# Patient Record
Sex: MALE | URBAN - METROPOLITAN AREA
[De-identification: names, ages, dates, MRNs, and addresses within clinical notes are randomized per-mention and may not be internally consistent; named-entity substitution may affect disease eponyms.]

---

## 2022-10-19 ENCOUNTER — APPOINTMENT (OUTPATIENT)
Dept: RADIOLOGY | Facility: MEDICAL CENTER | Age: 72
DRG: 190 | End: 2022-10-19
Attending: EMERGENCY MEDICINE
Payer: MEDICARE

## 2022-10-19 ENCOUNTER — APPOINTMENT (OUTPATIENT)
Dept: RADIOLOGY | Facility: MEDICAL CENTER | Age: 72
DRG: 190 | End: 2022-10-19
Attending: STUDENT IN AN ORGANIZED HEALTH CARE EDUCATION/TRAINING PROGRAM
Payer: MEDICARE

## 2022-10-19 ENCOUNTER — HOSPITAL ENCOUNTER (INPATIENT)
Facility: MEDICAL CENTER | Age: 72
LOS: 1 days | DRG: 190 | End: 2022-10-20
Attending: EMERGENCY MEDICINE | Admitting: INTERNAL MEDICINE
Payer: MEDICARE

## 2022-10-19 ENCOUNTER — APPOINTMENT (OUTPATIENT)
Dept: CARDIOLOGY | Facility: MEDICAL CENTER | Age: 72
DRG: 190 | End: 2022-10-19
Attending: INTERNAL MEDICINE
Payer: MEDICARE

## 2022-10-19 DIAGNOSIS — R47.9 SPEECH DISTURBANCE, UNSPECIFIED TYPE: ICD-10-CM

## 2022-10-19 DIAGNOSIS — R53.1 WEAKNESS: ICD-10-CM

## 2022-10-19 PROBLEM — R09.02 HYPOXIA: Status: ACTIVE | Noted: 2022-10-19

## 2022-10-19 PROBLEM — I25.2 HISTORY OF MI (MYOCARDIAL INFARCTION): Status: ACTIVE | Noted: 2022-10-19

## 2022-10-19 PROBLEM — R29.90 STROKE-LIKE SYMPTOMS: Status: ACTIVE | Noted: 2022-10-19

## 2022-10-19 PROBLEM — R25.1 TREMOR: Status: ACTIVE | Noted: 2022-10-19

## 2022-10-19 PROBLEM — J44.9 COPD (CHRONIC OBSTRUCTIVE PULMONARY DISEASE) (HCC): Status: ACTIVE | Noted: 2022-10-19

## 2022-10-19 PROBLEM — F39 MOOD DISORDER (HCC): Status: ACTIVE | Noted: 2022-10-19

## 2022-10-19 PROBLEM — J96.00 ACUTE RESPIRATORY FAILURE (HCC): Status: ACTIVE | Noted: 2022-10-19

## 2022-10-19 PROBLEM — Z95.818 PRESENCE OF IMPLANTABLE PULMONARY ARTERY PRESSURE AND HEART RATE MONITORING SYSTEM: Status: ACTIVE | Noted: 2022-10-19

## 2022-10-19 LAB
ABO + RH BLD: NORMAL
ABO GROUP BLD: NORMAL
ALBUMIN SERPL BCP-MCNC: 3.4 G/DL (ref 3.2–4.9)
ALBUMIN/GLOB SERPL: 1 G/DL
ALP SERPL-CCNC: 63 U/L (ref 30–99)
ALT SERPL-CCNC: 59 U/L (ref 2–50)
ANION GAP SERPL CALC-SCNC: 10 MMOL/L (ref 7–16)
ANISOCYTOSIS BLD QL SMEAR: ABNORMAL
APTT PPP: 27.4 SEC (ref 24.7–36)
AST SERPL-CCNC: 39 U/L (ref 12–45)
BASOPHILS # BLD AUTO: 0 % (ref 0–1.8)
BASOPHILS # BLD: 0 K/UL (ref 0–0.12)
BILIRUB SERPL-MCNC: 0.3 MG/DL (ref 0.1–1.5)
BLD GP AB SCN SERPL QL: NORMAL
BUN SERPL-MCNC: 14 MG/DL (ref 8–22)
BURR CELLS BLD QL SMEAR: NORMAL
CALCIUM SERPL-MCNC: 8.7 MG/DL (ref 8.5–10.5)
CHLORIDE SERPL-SCNC: 100 MMOL/L (ref 96–112)
CHOLEST SERPL-MCNC: 107 MG/DL (ref 100–199)
CO2 SERPL-SCNC: 25 MMOL/L (ref 20–33)
COMMENT 1642: NORMAL
CREAT SERPL-MCNC: 0.89 MG/DL (ref 0.5–1.4)
EKG IMPRESSION: NORMAL
EOSINOPHIL # BLD AUTO: 0.04 K/UL (ref 0–0.51)
EOSINOPHIL NFR BLD: 0.9 % (ref 0–6.9)
ERYTHROCYTE [DISTWIDTH] IN BLOOD BY AUTOMATED COUNT: 66.3 FL (ref 35.9–50)
EST. AVERAGE GLUCOSE BLD GHB EST-MCNC: 166 MG/DL
FLUAV RNA SPEC QL NAA+PROBE: POSITIVE
FLUBV RNA SPEC QL NAA+PROBE: NEGATIVE
GFR SERPLBLD CREATININE-BSD FMLA CKD-EPI: 90 ML/MIN/1.73 M 2
GLOBULIN SER CALC-MCNC: 3.3 G/DL (ref 1.9–3.5)
GLUCOSE BLD STRIP.AUTO-MCNC: 125 MG/DL (ref 65–99)
GLUCOSE SERPL-MCNC: 141 MG/DL (ref 65–99)
HBA1C MFR BLD: 7.4 % (ref 4–5.6)
HCT VFR BLD AUTO: 32.7 % (ref 42–52)
HDLC SERPL-MCNC: 48 MG/DL
HGB BLD-MCNC: 10.6 G/DL (ref 14–18)
IMM GRANULOCYTES # BLD AUTO: 0.02 K/UL (ref 0–0.11)
IMM GRANULOCYTES NFR BLD AUTO: 0.4 % (ref 0–0.9)
INR PPP: 1.19 (ref 0.87–1.13)
LDLC SERPL CALC-MCNC: 46 MG/DL
LV EJECT FRACT  99904: 70
LV EJECT FRACT MOD 2C 99903: 55.32
LV EJECT FRACT MOD 4C 99902: 49.34
LV EJECT FRACT MOD BP 99901: 52.53
LYMPHOCYTES # BLD AUTO: 1.74 K/UL (ref 1–4.8)
LYMPHOCYTES NFR BLD: 37.5 % (ref 22–41)
MACROCYTES BLD QL SMEAR: ABNORMAL
MAGNESIUM SERPL-MCNC: 1.8 MG/DL (ref 1.5–2.5)
MCH RBC QN AUTO: 28.3 PG (ref 27–33)
MCHC RBC AUTO-ENTMCNC: 32.4 G/DL (ref 33.7–35.3)
MCV RBC AUTO: 87.4 FL (ref 81.4–97.8)
MONOCYTES # BLD AUTO: 0.3 K/UL (ref 0–0.85)
MONOCYTES NFR BLD AUTO: 6.5 % (ref 0–13.4)
MORPHOLOGY BLD-IMP: NORMAL
NEUTROPHILS # BLD AUTO: 2.54 K/UL (ref 1.82–7.42)
NEUTROPHILS NFR BLD: 54.7 % (ref 44–72)
NRBC # BLD AUTO: 0 K/UL
NRBC BLD-RTO: 0 /100 WBC
NT-PROBNP SERPL IA-MCNC: 79 PG/ML (ref 0–125)
OVALOCYTES BLD QL SMEAR: NORMAL
PHOSPHATE SERPL-MCNC: 2.8 MG/DL (ref 2.5–4.5)
PLATELET # BLD AUTO: 251 K/UL (ref 164–446)
PLATELET BLD QL SMEAR: NORMAL
PMV BLD AUTO: 11.3 FL (ref 9–12.9)
POIKILOCYTOSIS BLD QL SMEAR: NORMAL
POTASSIUM SERPL-SCNC: 3.7 MMOL/L (ref 3.6–5.5)
PROCALCITONIN SERPL-MCNC: 0.17 NG/ML
PROT SERPL-MCNC: 6.7 G/DL (ref 6–8.2)
PROTHROMBIN TIME: 14.9 SEC (ref 12–14.6)
RBC # BLD AUTO: 3.74 M/UL (ref 4.7–6.1)
RBC BLD AUTO: PRESENT
RH BLD: NORMAL
RSV RNA SPEC QL NAA+PROBE: NEGATIVE
SARS-COV-2 RNA RESP QL NAA+PROBE: NOTDETECTED
SODIUM SERPL-SCNC: 135 MMOL/L (ref 135–145)
SPECIMEN SOURCE: ABNORMAL
TRIGL SERPL-MCNC: 63 MG/DL (ref 0–149)
TROPONIN T SERPL-MCNC: 14 NG/L (ref 6–19)
TSH SERPL DL<=0.005 MIU/L-ACNC: 1.1 UIU/ML (ref 0.38–5.33)
WBC # BLD AUTO: 4.6 K/UL (ref 4.8–10.8)

## 2022-10-19 PROCEDURE — 700111 HCHG RX REV CODE 636 W/ 250 OVERRIDE (IP): Performed by: INTERNAL MEDICINE

## 2022-10-19 PROCEDURE — 99223 1ST HOSP IP/OBS HIGH 75: CPT | Mod: FS | Performed by: NURSE PRACTITIONER

## 2022-10-19 PROCEDURE — 80053 COMPREHEN METABOLIC PANEL: CPT

## 2022-10-19 PROCEDURE — 86901 BLOOD TYPING SEROLOGIC RH(D): CPT

## 2022-10-19 PROCEDURE — 84484 ASSAY OF TROPONIN QUANT: CPT

## 2022-10-19 PROCEDURE — 86900 BLOOD TYPING SEROLOGIC ABO: CPT

## 2022-10-19 PROCEDURE — A9270 NON-COVERED ITEM OR SERVICE: HCPCS | Performed by: INTERNAL MEDICINE

## 2022-10-19 PROCEDURE — C9803 HOPD COVID-19 SPEC COLLECT: HCPCS | Performed by: INTERNAL MEDICINE

## 2022-10-19 PROCEDURE — 85025 COMPLETE CBC W/AUTO DIFF WBC: CPT

## 2022-10-19 PROCEDURE — 700102 HCHG RX REV CODE 250 W/ 637 OVERRIDE(OP): Performed by: INTERNAL MEDICINE

## 2022-10-19 PROCEDURE — 82962 GLUCOSE BLOOD TEST: CPT

## 2022-10-19 PROCEDURE — 700101 HCHG RX REV CODE 250: Performed by: INTERNAL MEDICINE

## 2022-10-19 PROCEDURE — 86850 RBC ANTIBODY SCREEN: CPT

## 2022-10-19 PROCEDURE — 0241U HCHG SARS-COV-2 COVID-19 NFCT DS RESP RNA 4 TRGT MIC: CPT

## 2022-10-19 PROCEDURE — 0042T CT-CEREBRAL PERFUSION ANALYSIS: CPT

## 2022-10-19 PROCEDURE — 93306 TTE W/DOPPLER COMPLETE: CPT | Mod: 26 | Performed by: INTERNAL MEDICINE

## 2022-10-19 PROCEDURE — 700117 HCHG RX CONTRAST REV CODE 255: Performed by: EMERGENCY MEDICINE

## 2022-10-19 PROCEDURE — 92610 EVALUATE SWALLOWING FUNCTION: CPT

## 2022-10-19 PROCEDURE — 80061 LIPID PANEL: CPT

## 2022-10-19 PROCEDURE — 93005 ELECTROCARDIOGRAM TRACING: CPT | Performed by: EMERGENCY MEDICINE

## 2022-10-19 PROCEDURE — 85730 THROMBOPLASTIN TIME PARTIAL: CPT

## 2022-10-19 PROCEDURE — 83735 ASSAY OF MAGNESIUM: CPT

## 2022-10-19 PROCEDURE — 70496 CT ANGIOGRAPHY HEAD: CPT

## 2022-10-19 PROCEDURE — 85610 PROTHROMBIN TIME: CPT

## 2022-10-19 PROCEDURE — 770020 HCHG ROOM/CARE - TELE (206)

## 2022-10-19 PROCEDURE — 700117 HCHG RX CONTRAST REV CODE 255: Performed by: INTERNAL MEDICINE

## 2022-10-19 PROCEDURE — 36415 COLL VENOUS BLD VENIPUNCTURE: CPT

## 2022-10-19 PROCEDURE — 99223 1ST HOSP IP/OBS HIGH 75: CPT | Mod: AI,GC | Performed by: STUDENT IN AN ORGANIZED HEALTH CARE EDUCATION/TRAINING PROGRAM

## 2022-10-19 PROCEDURE — 84145 PROCALCITONIN (PCT): CPT

## 2022-10-19 PROCEDURE — 84443 ASSAY THYROID STIM HORMONE: CPT

## 2022-10-19 PROCEDURE — 84100 ASSAY OF PHOSPHORUS: CPT

## 2022-10-19 PROCEDURE — 96374 THER/PROPH/DIAG INJ IV PUSH: CPT

## 2022-10-19 PROCEDURE — 70450 CT HEAD/BRAIN W/O DYE: CPT

## 2022-10-19 PROCEDURE — 71045 X-RAY EXAM CHEST 1 VIEW: CPT

## 2022-10-19 PROCEDURE — 83036 HEMOGLOBIN GLYCOSYLATED A1C: CPT

## 2022-10-19 PROCEDURE — 83880 ASSAY OF NATRIURETIC PEPTIDE: CPT

## 2022-10-19 PROCEDURE — 99285 EMERGENCY DEPT VISIT HI MDM: CPT

## 2022-10-19 PROCEDURE — 70498 CT ANGIOGRAPHY NECK: CPT

## 2022-10-19 PROCEDURE — 94664 DEMO&/EVAL PT USE INHALER: CPT

## 2022-10-19 PROCEDURE — 94640 AIRWAY INHALATION TREATMENT: CPT

## 2022-10-19 PROCEDURE — 93306 TTE W/DOPPLER COMPLETE: CPT

## 2022-10-19 PROCEDURE — 94760 N-INVAS EAR/PLS OXIMETRY 1: CPT

## 2022-10-19 RX ORDER — ALBUTEROL SULFATE 90 UG/1
2 AEROSOL, METERED RESPIRATORY (INHALATION) EVERY 6 HOURS PRN
COMMUNITY

## 2022-10-19 RX ORDER — TRIPROLIDINE HYDROCHLORIDE 2.5 MG/5ML
5 SYRUP ORAL EVERY 8 HOURS PRN
COMMUNITY

## 2022-10-19 RX ORDER — LORAZEPAM 2 MG/ML
1 INJECTION INTRAMUSCULAR ONCE
Status: ACTIVE | OUTPATIENT
Start: 2022-10-19 | End: 2022-10-20

## 2022-10-19 RX ORDER — POLYETHYLENE GLYCOL 3350 17 G/17G
1 POWDER, FOR SOLUTION ORAL
Status: DISCONTINUED | OUTPATIENT
Start: 2022-10-19 | End: 2022-10-20 | Stop reason: HOSPADM

## 2022-10-19 RX ORDER — ONDANSETRON 2 MG/ML
4 INJECTION INTRAMUSCULAR; INTRAVENOUS EVERY 4 HOURS PRN
Status: DISCONTINUED | OUTPATIENT
Start: 2022-10-19 | End: 2022-10-20 | Stop reason: HOSPADM

## 2022-10-19 RX ORDER — BISMUTH SUBSALICYLATE 262 MG/1
524 TABLET, CHEWABLE ORAL 4 TIMES DAILY PRN
Status: DISCONTINUED | OUTPATIENT
Start: 2022-10-19 | End: 2022-10-20 | Stop reason: HOSPADM

## 2022-10-19 RX ORDER — MIRTAZAPINE 30 MG/1
30 TABLET, FILM COATED ORAL NIGHTLY
COMMUNITY

## 2022-10-19 RX ORDER — FUROSEMIDE 10 MG/ML
40 INJECTION INTRAMUSCULAR; INTRAVENOUS
Status: DISCONTINUED | OUTPATIENT
Start: 2022-10-19 | End: 2022-10-20 | Stop reason: HOSPADM

## 2022-10-19 RX ORDER — ASPIRIN 300 MG/1
300 SUPPOSITORY RECTAL DAILY
Status: DISCONTINUED | OUTPATIENT
Start: 2022-10-19 | End: 2022-10-19

## 2022-10-19 RX ORDER — MIRTAZAPINE 30 MG/1
30 TABLET, FILM COATED ORAL NIGHTLY
Status: DISCONTINUED | OUTPATIENT
Start: 2022-10-19 | End: 2022-10-20 | Stop reason: HOSPADM

## 2022-10-19 RX ORDER — PANTOPRAZOLE SODIUM 40 MG/1
40 TABLET, DELAYED RELEASE ORAL DAILY
Status: DISCONTINUED | OUTPATIENT
Start: 2022-10-19 | End: 2022-10-19

## 2022-10-19 RX ORDER — ROSUVASTATIN CALCIUM 40 MG/1
40 TABLET, COATED ORAL DAILY
COMMUNITY

## 2022-10-19 RX ORDER — ROSUVASTATIN CALCIUM 20 MG/1
40 TABLET, COATED ORAL EVERY EVENING
Status: DISCONTINUED | OUTPATIENT
Start: 2022-10-19 | End: 2022-10-20 | Stop reason: HOSPADM

## 2022-10-19 RX ORDER — ASPIRIN 325 MG
325 TABLET ORAL DAILY
Status: DISCONTINUED | OUTPATIENT
Start: 2022-10-19 | End: 2022-10-19

## 2022-10-19 RX ORDER — ASPIRIN 81 MG/1
81 TABLET ORAL DAILY
COMMUNITY

## 2022-10-19 RX ORDER — BUDESONIDE AND FORMOTEROL FUMARATE DIHYDRATE 80; 4.5 UG/1; UG/1
2 AEROSOL RESPIRATORY (INHALATION)
Status: DISCONTINUED | OUTPATIENT
Start: 2022-10-19 | End: 2022-10-20 | Stop reason: HOSPADM

## 2022-10-19 RX ORDER — DULOXETIN HYDROCHLORIDE 60 MG/1
60 CAPSULE, DELAYED RELEASE ORAL 2 TIMES DAILY
Status: DISCONTINUED | OUTPATIENT
Start: 2022-10-19 | End: 2022-10-20 | Stop reason: HOSPADM

## 2022-10-19 RX ORDER — ATORVASTATIN CALCIUM 40 MG/1
40 TABLET, FILM COATED ORAL EVERY EVENING
Status: DISCONTINUED | OUTPATIENT
Start: 2022-10-19 | End: 2022-10-19

## 2022-10-19 RX ORDER — ALPRAZOLAM 1 MG/1
1 TABLET ORAL
COMMUNITY

## 2022-10-19 RX ORDER — ALBUTEROL SULFATE 90 UG/1
2 AEROSOL, METERED RESPIRATORY (INHALATION)
Status: DISCONTINUED | OUTPATIENT
Start: 2022-10-19 | End: 2022-10-20 | Stop reason: HOSPADM

## 2022-10-19 RX ORDER — METOPROLOL SUCCINATE 25 MG/1
25 TABLET, EXTENDED RELEASE ORAL DAILY
Status: DISCONTINUED | OUTPATIENT
Start: 2022-10-19 | End: 2022-10-20 | Stop reason: HOSPADM

## 2022-10-19 RX ORDER — BENZONATATE 100 MG/1
100-200 CAPSULE ORAL 3 TIMES DAILY PRN
COMMUNITY

## 2022-10-19 RX ORDER — BUSPIRONE HYDROCHLORIDE 10 MG/1
30 TABLET ORAL 2 TIMES DAILY
Status: DISCONTINUED | OUTPATIENT
Start: 2022-10-19 | End: 2022-10-20 | Stop reason: HOSPADM

## 2022-10-19 RX ORDER — ASPIRIN 81 MG/1
324 TABLET, CHEWABLE ORAL DAILY
Status: DISCONTINUED | OUTPATIENT
Start: 2022-10-19 | End: 2022-10-19

## 2022-10-19 RX ORDER — AMOXICILLIN 250 MG
2 CAPSULE ORAL 2 TIMES DAILY
Status: DISCONTINUED | OUTPATIENT
Start: 2022-10-19 | End: 2022-10-20 | Stop reason: HOSPADM

## 2022-10-19 RX ORDER — OMEPRAZOLE 20 MG/1
40 CAPSULE, DELAYED RELEASE ORAL DAILY
Status: DISCONTINUED | OUTPATIENT
Start: 2022-10-19 | End: 2022-10-20 | Stop reason: HOSPADM

## 2022-10-19 RX ORDER — LORAZEPAM 2 MG/ML
2 INJECTION INTRAMUSCULAR ONCE
Status: DISCONTINUED | OUTPATIENT
Start: 2022-10-19 | End: 2022-10-19

## 2022-10-19 RX ORDER — PRIMIDONE 50 MG/1
50 TABLET ORAL EVERY EVENING
COMMUNITY

## 2022-10-19 RX ORDER — BUSPIRONE HYDROCHLORIDE 30 MG/1
30 TABLET ORAL 2 TIMES DAILY
COMMUNITY

## 2022-10-19 RX ORDER — OSELTAMIVIR PHOSPHATE 45 MG/1
45 CAPSULE ORAL EVERY 12 HOURS
COMMUNITY

## 2022-10-19 RX ORDER — ACETAMINOPHEN 325 MG/1
650 TABLET ORAL EVERY 6 HOURS PRN
Status: DISCONTINUED | OUTPATIENT
Start: 2022-10-19 | End: 2022-10-20 | Stop reason: HOSPADM

## 2022-10-19 RX ORDER — BISACODYL 10 MG
10 SUPPOSITORY, RECTAL RECTAL
Status: DISCONTINUED | OUTPATIENT
Start: 2022-10-19 | End: 2022-10-20 | Stop reason: HOSPADM

## 2022-10-19 RX ORDER — METOPROLOL SUCCINATE 50 MG/1
50 TABLET, EXTENDED RELEASE ORAL DAILY
COMMUNITY

## 2022-10-19 RX ORDER — FLUTICASONE PROPIONATE AND SALMETEROL 250; 50 UG/1; UG/1
1 POWDER RESPIRATORY (INHALATION) EVERY 12 HOURS
COMMUNITY

## 2022-10-19 RX ORDER — GABAPENTIN 100 MG/1
100 CAPSULE ORAL
Status: DISCONTINUED | OUTPATIENT
Start: 2022-10-19 | End: 2022-10-20 | Stop reason: HOSPADM

## 2022-10-19 RX ORDER — DULOXETIN HYDROCHLORIDE 60 MG/1
60 CAPSULE, DELAYED RELEASE ORAL 2 TIMES DAILY
COMMUNITY

## 2022-10-19 RX ORDER — ALPRAZOLAM 1 MG/1
1 TABLET ORAL
Status: DISCONTINUED | OUTPATIENT
Start: 2022-10-19 | End: 2022-10-20 | Stop reason: HOSPADM

## 2022-10-19 RX ORDER — GABAPENTIN 100 MG/1
100 CAPSULE ORAL
COMMUNITY

## 2022-10-19 RX ORDER — IPRATROPIUM BROMIDE AND ALBUTEROL SULFATE 2.5; .5 MG/3ML; MG/3ML
3 SOLUTION RESPIRATORY (INHALATION)
Status: DISCONTINUED | OUTPATIENT
Start: 2022-10-19 | End: 2022-10-20 | Stop reason: HOSPADM

## 2022-10-19 RX ORDER — BISMUTH SUBSALICYLATE 262 MG/1
524 TABLET, CHEWABLE ORAL
COMMUNITY

## 2022-10-19 RX ORDER — ATORVASTATIN CALCIUM 80 MG/1
80 TABLET, FILM COATED ORAL EVERY EVENING
Status: DISCONTINUED | OUTPATIENT
Start: 2022-10-19 | End: 2022-10-19

## 2022-10-19 RX ORDER — PRIMIDONE 50 MG/1
50 TABLET ORAL
Status: DISCONTINUED | OUTPATIENT
Start: 2022-10-19 | End: 2022-10-20 | Stop reason: HOSPADM

## 2022-10-19 RX ORDER — PANTOPRAZOLE SODIUM 40 MG/1
40 TABLET, DELAYED RELEASE ORAL DAILY
COMMUNITY

## 2022-10-19 RX ORDER — ONDANSETRON 4 MG/1
4 TABLET, ORALLY DISINTEGRATING ORAL EVERY 4 HOURS PRN
Status: DISCONTINUED | OUTPATIENT
Start: 2022-10-19 | End: 2022-10-20 | Stop reason: HOSPADM

## 2022-10-19 RX ADMIN — METOPROLOL SUCCINATE 25 MG: 25 TABLET, FILM COATED, EXTENDED RELEASE ORAL at 17:13

## 2022-10-19 RX ADMIN — PRIMIDONE 50 MG: 50 TABLET ORAL at 21:29

## 2022-10-19 RX ADMIN — FUROSEMIDE 40 MG: 10 INJECTION, SOLUTION INTRAMUSCULAR; INTRAVENOUS at 16:20

## 2022-10-19 RX ADMIN — FUROSEMIDE 40 MG: 10 INJECTION, SOLUTION INTRAMUSCULAR; INTRAVENOUS at 08:36

## 2022-10-19 RX ADMIN — DULOXETINE HYDROCHLORIDE 60 MG: 60 CAPSULE, DELAYED RELEASE ORAL at 17:13

## 2022-10-19 RX ADMIN — HUMAN ALBUMIN MICROSPHERES AND PERFLUTREN 3 ML: 10; .22 INJECTION, SOLUTION INTRAVENOUS at 12:14

## 2022-10-19 RX ADMIN — GABAPENTIN 100 MG: 100 CAPSULE ORAL at 21:29

## 2022-10-19 RX ADMIN — IOHEXOL 40 ML: 350 INJECTION, SOLUTION INTRAVENOUS at 00:29

## 2022-10-19 RX ADMIN — BUSPIRONE HYDROCHLORIDE 30 MG: 10 TABLET ORAL at 17:13

## 2022-10-19 RX ADMIN — OMEPRAZOLE 40 MG: 20 CAPSULE, DELAYED RELEASE ORAL at 16:20

## 2022-10-19 RX ADMIN — MIRTAZAPINE 30 MG: 30 TABLET, ORALLY DISINTEGRATING ORAL at 21:29

## 2022-10-19 RX ADMIN — IOHEXOL 70 ML: 350 INJECTION, SOLUTION INTRAVENOUS at 00:34

## 2022-10-19 RX ADMIN — IPRATROPIUM BROMIDE AND ALBUTEROL SULFATE 3 ML: .5; 2.5 SOLUTION RESPIRATORY (INHALATION) at 08:48

## 2022-10-19 RX ADMIN — ROSUVASTATIN CALCIUM 40 MG: 20 TABLET, FILM COATED ORAL at 17:13

## 2022-10-19 ASSESSMENT — ENCOUNTER SYMPTOMS
FEVER: 0
SEIZURES: 0
ABDOMINAL PAIN: 0
NECK PAIN: 0
BLURRED VISION: 0
SENSORY CHANGE: 0
BACK PAIN: 0
LOSS OF CONSCIOUSNESS: 0
BLOOD IN STOOL: 0
TREMORS: 1
HEADACHES: 0
SORE THROAT: 0
CHILLS: 0
FOCAL WEAKNESS: 1
PALPITATIONS: 0
NAUSEA: 0
WEAKNESS: 0
SPEECH CHANGE: 0
TINGLING: 0
CONSTIPATION: 0
DIZZINESS: 0
COUGH: 0
DOUBLE VISION: 0
DIARRHEA: 0
VOMITING: 0
SHORTNESS OF BREATH: 1

## 2022-10-19 ASSESSMENT — COGNITIVE AND FUNCTIONAL STATUS - GENERAL
DAILY ACTIVITIY SCORE: 24
MOBILITY SCORE: 24
SUGGESTED CMS G CODE MODIFIER DAILY ACTIVITY: CH
SUGGESTED CMS G CODE MODIFIER MOBILITY: CH

## 2022-10-19 ASSESSMENT — PATIENT HEALTH QUESTIONNAIRE - PHQ9
1. LITTLE INTEREST OR PLEASURE IN DOING THINGS: NOT AT ALL
2. FEELING DOWN, DEPRESSED, IRRITABLE, OR HOPELESS: NOT AT ALL
SUM OF ALL RESPONSES TO PHQ9 QUESTIONS 1 AND 2: 0

## 2022-10-19 ASSESSMENT — LIFESTYLE VARIABLES
DOES PATIENT WANT TO STOP DRINKING: NO
EVER HAD A DRINK FIRST THING IN THE MORNING TO STEADY YOUR NERVES TO GET RID OF A HANGOVER: NO
HAVE PEOPLE ANNOYED YOU BY CRITICIZING YOUR DRINKING: NO
HOW MANY TIMES IN THE PAST YEAR HAVE YOU HAD 5 OR MORE DRINKS IN A DAY: 0
CONSUMPTION TOTAL: NEGATIVE
AVERAGE NUMBER OF DAYS PER WEEK YOU HAVE A DRINK CONTAINING ALCOHOL: 0
TOTAL SCORE: 0
ON A TYPICAL DAY WHEN YOU DRINK ALCOHOL HOW MANY DRINKS DO YOU HAVE: 0
ALCOHOL_USE: NO
TOTAL SCORE: 0
TOTAL SCORE: 0
HAVE YOU EVER FELT YOU SHOULD CUT DOWN ON YOUR DRINKING: NO
EVER FELT BAD OR GUILTY ABOUT YOUR DRINKING: NO

## 2022-10-19 NOTE — ASSESSMENT & PLAN NOTE
States they have chronic tremor, using primidone and gabapentin.    -Pending confirmation of meds by pharmacy

## 2022-10-19 NOTE — THERAPY
Speech Language Pathology   Clinical Swallow Evaluation     Patient Name: Abiel Miller  AGE:  72 y.o., SEX:  male  Medical Record #: [id number]  Today's Date: 10/19/2022          HPI: 73 y/o M admitted for observation 10/19/22 after flying from [place] with lethargy, slurred speech, AMS, worsening RLE weakness. Pt apparently flew from [facility] to [place] today having taken Ativan beforehand due to claustrophobia. Upon arrival, pt/family noticed aforementioned sx.CT perfusion, CT head, CTA head/neck all negative. MRI ordered but not done d/t claustrophobia with pt requiring sedation.     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      Level of Consciousness: Alert  Affect/Behavior: Calm, Cooperative  Follows Directives: Yes  Orientation: Oriented x 4  Hearing: Functional hearing  Vision: Functional vision      Prior Living Situation & Level of Function:  Patient lives in [place] w/ family. He flew to [place] to visit his brother who lives in the [facility]. Patient is I with ADLs, IADLs and consumes a regular/thin diet at baseline.     Oral Mechanism Evaluation  Facial Symmetry: Equal  Facial Sensation: Equal  Labial Observations: WFL  Lingual Observations: Midline, Xerostomia  Dentition: Good  Comments:      Voice  Quality: WFL  Resonance: WFL  Intensity: Appropriate  Cough: WFL  Comments:      Current Method of Nutrition   NPO until cleared by speech pathology      Subjective  Pt reports no difficulty swallowing at baseline or since hospitalization. He states he is thirsty and happy to have water.      Assessment  Positioning: Thomas's (60-90 degrees)  Bolus Administration: Patient  Oxygen Requirements: Room Air  Factor(s) Affecting Performance: None    Swallowing Trials  Ice: WFL  Thin Liquid (TN0): WFL  Liquidised (LQ3): WFL  Regular (RG7): WFL    Comments:  Patient was able to reposition and feed himself. He demonstrated good oral containment, mastication,  bolus formation and transit with no abnormal oral residue post swallow. Pharyngeal swallow response was timely and no s/sx of aspiration occurred w/ PO intake.      Clinical Impressions  No clinical s/sx of oropharyngeal dysphagia or concerns for aspiration. Diet modification is not indicated. SLP will not actively follow. Please reconsult with any change in status.      Recommendations  1.  Regular/thin diet  2.  Instrumentation: None indicated at this time  3.  Swallowing Instructions & Precautions:   Supervision: Independent  Positioning: Fully upright and midline during oral intake  Medication: Whole with liquid  Strategies: None  Oral Care: BID    4. Cognitive-linguistic evaluation is not indicated per discussion w/ MD. SLP will complete the orders.     Plan    Recommend Speech Therapy for Evaluation only for the following treatments:  Dysphagia Training and Patient / Family / Caregiver Education.    Discharge Recommendations: (P) Anticipate that the patient will have no further speech therapy needs after discharge from the hospital

## 2022-10-19 NOTE — ED TRIAGE NOTES
Chief Complaint   Patient presents with    Possible Stroke     Pt BIB via [facility] from scene to Charge Desk for Stroke IR. Patient was traveling today from [place] to [place].Pt reports taking x2 ativan pills pre-flight. When pt arrived, family reports he was not being him self. LKW 1100. Reports patient was lethargic, slurred speech, AMS, and generalized lower extremity weakness. Pt has had a history of x 2 strokes in the past, hx of DM, and MI. NIH score 2. PIV in place. Pt reports he is on blood thinners but does not know which one. Per family pt was hypoxic on room air at 70%. Pt was placed on 4L of oxygen NC by care flight. FSBS 206. GCS 15.     On arrival pt is A/O x 4 and has some generalized weakness. Pt reports R sided deficits as residual from his other two strokes.     Patient to CT with TNTL.

## 2022-10-19 NOTE — ED PROVIDER NOTES
"ED Provider Note    CHIEF COMPLAINT  Chief Complaint   Patient presents with    Possible Stroke       HPI  Abiel Miller is a 72 y.o. male who presents for evaluation after experiencing difficulty speaking and increasing right-sided extremity weakness.  Patient is unable to tell me exactly when the symptoms started however he notes that he flew from [place] this morning to visit family and took 2 Ativan when he got on the plane as he always does due to anxiety.  When he got to his family's house today they noted his speech was halting and he appeared to have acute on chronic weakness to the right extremities.  Patient notes he always has some weakness in the right extremities since his second stroke but feels that it is worse now.  He notes no recent fevers, chills, headache, or recent trauma.  He has no nausea, vomiting, abdominal pain he denies any sensory changes, double vision blurry vision, or difficulty swallowing.  He notes he has been taking his medications what seems to not know what those medications are aside from the fact that he is on a \"blood thinner.\"  Notable that when EMS got to the family's house the patient was apparently mildly hypoxic on room air with saturations in the 80s.    REVIEW OF SYSTEMS  Constitutional: No fevers or chills  Skin: No rashes, abrasions, lacerations, or pruritus  HEENT: No sore throat, runny nose, sores, trouble swallowing.  Possible trouble speaking.  Neck: No neck pain, stiffness, or masses.  Chest: No pain or rashes  Pulm: No shortness of breath, cough, wheezing, stridor, or pain with inspiration/expiration  Gastrointestinal: No nausea, vomiting, diarrhea, constipation, bloating, melena, hematochezia or abdominal pain.  Genitourinary: No dysuria or hematuria  Musculoskeletal: No recent trauma, pain, or swelling  Neurologic: Denies any confusion or disorientation.  No sensory changes.  Increased weakness to the right extremities.  Heme: On some form of \"blood " "thinner.\"  Immuno: No hx of recurrent infections    PAST FAM HISTORY  History reviewed. No pertinent family history.    PAST MEDICAL HISTORY   has a past medical history of Diabetes (HCC), MI (myocardial infarction) (HCC), and Stroke (HCC).    SOCIAL HISTORY  Social History     Tobacco Use    Smoking status: Never    Smokeless tobacco: Never   Vaping Use    Vaping Use: Never used   Substance and Sexual Activity    Alcohol use: Not Currently    Drug use: Not Currently    Sexual activity: Not on file       SURGICAL HISTORY  patient denies any surgical history    CURRENT MEDICATIONS  Patient does not remember the names of his medications but knows he is on a \"blood thinner.\"  Neurologic deficit consistent with small CVA      ALLERGIES  No Known Allergies    PHYSICAL EXAM  VITAL SIGNS: /70   Pulse 91   Resp (!) 22   Ht 1.778 m (5' 10\")   Wt 104 kg (230 lb)   SpO2 96%   BMI 33.00 kg/m²    Gen: Appears tired, otherwise no apparent distress  HEENT: No signs of trauma, Bilateral external ears normal, Nose normal. Conjunctiva normal, Non-icteric.   Neck:  No tenderness, Supple, No masses  Lymphatic: No cervical lymphadenopathy noted.   Cardiovascular: Regular rate and rhythm, no murmurs.  Capillary refill less than 3 seconds to all extremities, 2+ distal pulses.  Thorax & Lungs: Normal breath sounds, No respiratory distress, No wheezing bilateral chest rise  Abdomen: Bowel sounds normal, Soft, No tenderness, No masses, No pulsatile masses. No Guarding or rebound  Skin: Warm, Dry, No erythema, No rash noted to exposed areas.   Back: No bony tenderness, No CVA tenderness.   Extremities: Intact distal pulses, No edema  Neurologic: CN 2: Visual acuity grossly intact, visual fields grossly intact  CN 2-3: Pupils equal round reactive to light and accommodation  CN 3, 4, 6: Extraocular eye movements intact, no lid lag  CN 5: Facial sensation intact to light touch bilaterally  CN 7: Face symmetric, no droop  CN 8: " Hearing intact to finger rub bilaterally  CN 9,10: Swallowing normally, soft palate elevates normally  CN 4, 7, 10, 12: Voice normal, no dysarthria  CN 11: Strong shoulder shrug, good strength with head rotation  CN 12: No deviation of the tongue    Motor:   RUE: 4.5 out of 5 strength proximally and distally, no pronator or arm drift  LUE:5 out of 5 strength proximally and distally, no pronator or arm drift  RLE:3 out of 5 strength proximally and distally.  Unable to lift leg off the gurney.  LLE:5 out of 5 strength proximally and distally, no leg drift    Sensory:  RUE: Sensation intact to light touch, equal bilat  RLE:  Sensation intact to light touch, equal bilat  LUE: Sensation intact to light touch, equal bilat  LLE: Sensation intact to light touch, equal bilat   NIH 2 at the charge desk upon arrival  Psychiatric: Affect pleasant      LABS  Results for orders placed or performed during the hospital encounter of 10/19/22   CBC WITH DIFFERENTIAL   Result Value Ref Range    WBC 4.6 (L) 4.8 - 10.8 K/uL    RBC 3.74 (L) 4.70 - 6.10 M/uL    Hemoglobin 10.6 (L) 14.0 - 18.0 g/dL    Hematocrit 32.7 (L) 42.0 - 52.0 %    MCV 87.4 81.4 - 97.8 fL    MCH 28.3 27.0 - 33.0 pg    MCHC 32.4 (L) 33.7 - 35.3 g/dL    RDW 66.3 (H) 35.9 - 50.0 fL    Platelet Count 251 164 - 446 K/uL    MPV 11.3 9.0 - 12.9 fL    Neutrophils-Polys 54.70 44.00 - 72.00 %    Lymphocytes 37.50 22.00 - 41.00 %    Monocytes 6.50 0.00 - 13.40 %    Eosinophils 0.90 0.00 - 6.90 %    Basophils 0.00 0.00 - 1.80 %    Immature Granulocytes 0.40 0.00 - 0.90 %    Nucleated RBC 0.00 /100 WBC    Neutrophils (Absolute) 2.54 1.82 - 7.42 K/uL    Lymphs (Absolute) 1.74 1.00 - 4.80 K/uL    Monos (Absolute) 0.30 0.00 - 0.85 K/uL    Eos (Absolute) 0.04 0.00 - 0.51 K/uL    Baso (Absolute) 0.00 0.00 - 0.12 K/uL    Immature Granulocytes (abs) 0.02 0.00 - 0.11 K/uL    NRBC (Absolute) 0.00 K/uL    Anisocytosis 1+     Macrocytosis 1+    COD (ADULT)   Result Value Ref Range    ABO  Grouping Only A     Rh Grouping Only POS     Antibody Screen-Cod NEG    ABO Rh Confirm   Result Value Ref Range    ABO Rh Confirm A POS    Comp Metabolic Panel   Result Value Ref Range    Sodium 135 135 - 145 mmol/L    Potassium 3.7 3.6 - 5.5 mmol/L    Chloride 100 96 - 112 mmol/L    Co2 25 20 - 33 mmol/L    Anion Gap 10.0 7.0 - 16.0    Glucose 141 (H) 65 - 99 mg/dL    Bun 14 8 - 22 mg/dL    Creatinine 0.89 0.50 - 1.40 mg/dL    Calcium 8.7 8.5 - 10.5 mg/dL    AST(SGOT) 39 12 - 45 U/L    ALT(SGPT) 59 (H) 2 - 50 U/L    Alkaline Phosphatase 63 30 - 99 U/L    Total Bilirubin 0.3 0.1 - 1.5 mg/dL    Albumin 3.4 3.2 - 4.9 g/dL    Total Protein 6.7 6.0 - 8.2 g/dL    Globulin 3.3 1.9 - 3.5 g/dL    A-G Ratio 1.0 g/dL   TROPONIN   Result Value Ref Range    Troponin T 14 6 - 19 ng/L   Prothrombin Time   Result Value Ref Range    PT 14.9 (H) 12.0 - 14.6 sec    INR 1.19 (H) 0.87 - 1.13   APTT   Result Value Ref Range    APTT 27.4 24.7 - 36.0 sec   ESTIMATED GFR   Result Value Ref Range    GFR (CKD-EPI) 90 >60 mL/min/1.73 m 2   PERIPHERAL SMEAR REVIEW   Result Value Ref Range    Peripheral Smear Review see below    PLATELET ESTIMATE   Result Value Ref Range    Plt Estimation Normal    MORPHOLOGY   Result Value Ref Range    RBC Morphology Present     Poikilocytosis 1+     Ovalocytes 1+     Echinocytes 1+    DIFFERENTIAL COMMENT   Result Value Ref Range    Comments-Diff see below    PROCALCITONIN   Result Value Ref Range    Procalcitonin 0.17 <0.25 ng/mL   proBrain Natriuretic Peptide, NT   Result Value Ref Range    NT-proBNP 79 0 - 125 pg/mL   MAGNESIUM   Result Value Ref Range    Magnesium 1.8 1.5 - 2.5 mg/dL   PHOSPHORUS   Result Value Ref Range    Phosphorus 2.8 2.5 - 4.5 mg/dL   Lipid Profile   Result Value Ref Range    Cholesterol,Tot 107 100 - 199 mg/dL    Triglycerides 63 0 - 149 mg/dL    HDL 48 >=40 mg/dL    LDL 46 <100 mg/dL   TSH WITH REFLEX TO FT4   Result Value Ref Range    TSH 1.100 0.380 - 5.330 uIU/mL   EKG (NOW)    Result Value Ref Range    Report       [facility] Emergency Dept.    Test Date:  2022-10-19  Pt Name:    SHONA DIAZ                 Department: ER  MRN:        [id number]                      Room:       BL 22  Gender:     Male                         Technician: [id number]  :        [id number]                   Requested By:DIANA CASTRO  Order #:    [id number]                    Reading MD:    Measurements  Intervals                                Axis  Rate:       92                           P:          67  NE:         225                          QRS:        -44  QRSD:       115                          T:          49  QT:         379  QTc:        469    Interpretive Statements  Sinus rhythm  Prolonged NE interval  Incomplete left bundle branch block  No previous ECG available for comparison     POCT glucose device results   Result Value Ref Range    POC Glucose, Blood 125 (H) 65 - 99 mg/dL       RADIOLOGY  DX-CHEST-PORTABLE (1 VIEW)   Final Result      1.  Left-sided pulmonary artery pressure monitor in place      2.  Cardiomegaly with evidence of mild fluid overload.      CT-CTA NECK WITH & W/O-POST PROCESSING   Final Result      CT angiogram of the neck within normal limits.      CT-CTA HEAD WITH & W/O-POST PROCESS   Final Result      CT angiogram of the [place] within normal limits.      CT-CEREBRAL PERFUSION ANALYSIS   Final Result      Cerebral Perfusion study with no evidence of acute cerebral infarction or ischemia with attention to the MCA territories.      CT-HEAD W/O   Final Result      1.  Cerebral atrophy.      2.  Otherwise, Head CT without contrast within normal limits. No evidence of acute cerebral infarction, hemorrhage or mass lesion.         MR-BRAIN-W/O    (Results Pending)     Critical Care Note  Upon my evaluation, this patient had high probability of imminent and life-threatening deterioration due to neurologic deficit consistent with a small CVA, hypoxemic,  which required my direct attention, intervention, and personal management. I personally provided 35 minutes of critical care time exclusive of time spent on separately billable procedures. Time includes review of laboratory data, radiology results, discussion with consultants, and monitoring for potential decompensation.      COURSE & MEDICAL DECISION MAKING  Patient arrives for evaluation of symptoms that are not quite convincing for new CVA and at, as the patient is unclear about when the symptoms actually started, he does not meet criteria for tPA.  Notable patient is also on some form of anticoagulation although he does not know.  I suspect this is more of a metabolic or possibly infectious issue based on the fact the patient was somewhat hypoxic when EMS first picked him up from his family's home.  Case was briefly discussed with Dr. Irvin, neurology, who agrees with the plan to proceed with imaging as patient is most certainly at risk for another small stroke.    Patient's labs are generally reassuring but if he is on Coumadin, he is subtherapeutic raising his risk for thrombus and embolus.  If he is on a different anticoagulant, it may not show up in the INR.  Regardless, the patient symptomology could be indicative of a small ischemic CVA but as he has no large vessel occlusion and an unknown time of onset, both systemic and targeted thrombolytic therapy is not indicated.  Patient states understanding of the need for admission and likely MRI.  Patient was discussed with the admitting physician who will evaluate the patient in the emergency department.    FINAL IMPRESSION  1. Weakness    2. Speech disturbance, unspecified type        Electronically signed by: Daron Estrada M.D., 10/19/2022 12:41 AM

## 2022-10-19 NOTE — ED NOTES
"MRI stated they would be coming down to get patient. This RN went in to premedicate for MRI scan. Patient is refusing MRI unless we have an open MRI machine due to his claustraphobia. It was explained to patient that the ativan is being prescribed to help with the experience. Patient continued to refuse MRI even with medication stating \"I've had 19 of these and can only do it in an open MRI\" \"I will crawl out of the machine.\" MD made aware of patient's refusal.  "

## 2022-10-19 NOTE — PROGRESS NOTES
72 year old man on AC with history of prior strokes presents with acute on chronic RUE and RLE weakness. He is unclear exactly when this started but feels somewhat weaker than usual on the R and has some hesitancy of his speech.    Not likely to be an LVO given relatively subjective presentation. No candidate for thrombolyttics due to AC usage.     Recommend admission for obs, PT/OT, MRI Brain without contrast to rule out new vascular event.

## 2022-10-19 NOTE — PROGRESS NOTES
This RN assumes care of patient from ED. Transport on zoll to room S.196 at 1120. VSS, all belongings with patient except one pair of glassess could not be found. Patient states he has second pair with him.

## 2022-10-19 NOTE — PROGRESS NOTES
4 Eyes Skin Assessment Completed by BEN Vickers and BEN Cavazos.    Head WDL  Ears WDL  Nose WDL  Mouth WDL  Neck WDL  Breast/Chest WDL  Shoulder Blades WDL  Spine WDL  (R) Arm/Elbow/Hand WDL  (L) Arm/Elbow/Hand WDL  Abdomen WDL  Groin WDL  Scrotum/Coccyx/Buttocks Redness and Blanching  (R) Leg WDL  (L) Leg WDL  (R) Heel/Foot/Toe Redness and Blanching  (L) Heel/Foot/Toe Redness and Blanching          Devices In Places Tele Box, Pulse Ox, and Nasal Cannula      Interventions In Place Gray Ear Foams and Pillows    Possible Skin Injury No    Pictures Uploaded Into Epic N/A  Wound Consult Placed N/A  RN Wound Prevention Protocol Ordered Yes

## 2022-10-19 NOTE — CONSULTS
Chief Complaint   Patient presents with    Possible Stroke         Neurology Initial Consult H&P  Neurohospitalist Service, [facility] Neurosciences    History of present illness:  Abiel Miller is a 72 y.o. male with a PMHx of DM II, MI s/p stenting, CVA x2 with right sided deficits at baseline, essential tremor, and possible COPD/emphysema who presented early this morning with worsening acute on chronic RUE and RLE weakness concerning for possible stroke. Per the patient he had flown from [place] yesterday and had taken 2mg of Ativan prior to the flight for anxiety. Upon arriving in [place] the patient's family noted that the patient was behaving atypically and that their RUE and RLE appeared weaker than baseline.    In the ED a CT head was negative for acute intracranial processes, CTA head/neck were negative for LVO or critical flow limiting stenosis, amd CT perfusion was negative for CBF mismatch. The patient was deemed not a candidate for thrombolytic therapy due to last known well. Patient was also not a candidate for thrombectomy due to absence of LVO.    Neurology was consulted by Dr. Shaheen Martell for further evaluation of the above.     Past medical history:   Past Medical History:   Diagnosis Date    Diabetes (HCC)     MI (myocardial infarction) (HCC)     Stroke (HCC)     x 2       Past surgical history:   History reviewed. No pertinent surgical history.    Family history:   History reviewed. No pertinent family history.    Social history:   Social History     Socioeconomic History    Marital status: Not on file     Spouse name: Not on file    Number of children: Not on file    Years of education: Not on file    Highest education level: Not on file   Occupational History    Not on file   Tobacco Use    Smoking status: Never    Smokeless tobacco: Never   Vaping Use    Vaping Use: Never used   Substance and Sexual Activity    Alcohol use: Not Currently    Drug use: Not Currently    Sexual activity:  Not on file   Other Topics Concern    Not on file   Social History Narrative    Not on file     Social Determinants of Health     Financial Resource Strain: Not on file   Food Insecurity: Not on file   Transportation Needs: Not on file   Physical Activity: Not on file   Stress: Not on file   Social Connections: Not on file   Intimate Partner Violence: Not on file   Housing Stability: Not on file       Current medications:   Current Facility-Administered Medications   Medication Dose    senna-docusate (PERICOLACE or SENOKOT S) 8.6-50 MG per tablet 2 Tablet  2 Tablet    And    polyethylene glycol/lytes (MIRALAX) PACKET 1 Packet  1 Packet    And    magnesium hydroxide (MILK OF MAGNESIA) suspension 30 mL  30 mL    And    bisacodyl (DULCOLAX) suppository 10 mg  10 mg    Respiratory Therapy Consult      acetaminophen (Tylenol) tablet 650 mg  650 mg    ondansetron (ZOFRAN) syringe/vial injection 4 mg  4 mg    ondansetron (ZOFRAN ODT) dispertab 4 mg  4 mg    atorvastatin (LIPITOR) tablet 80 mg  80 mg    aspirin (ASA) tablet 325 mg  325 mg    Or    aspirin (ASA) chewable tab 324 mg  324 mg    Or    aspirin (ASA) suppository 300 mg  300 mg    LORazepam (ATIVAN) injection 1 mg  1 mg    furosemide (LASIX) injection 40 mg  40 mg    ipratropium-albuterol (DUONEB) nebulizer solution  3 mL     Current Outpatient Medications   Medication    benzonatate (TESSALON) 100 MG Cap    ALPRAZolam (XANAX) 1 MG Tab       Medication Allergy:  No Known Allergies    Review of systems:   Constitutional: denies fever, night sweats, weight loss.   Eyes: denies acute vision change, eye pain or secretion.   Ears, Nose, Mouth, Throat: denies nasal secretion, nasal bleeding, difficulty swallowing, hearing loss, tinnitus, vertigo, ear pain, acute dental problems, oral ulcers or lesions.   Endocrine: denies recent weight changes, heat or cold intolerance, polyuria, polydypsia, polyphagia,abnormal hair growth.  Cardiovascular: denies new onset of chest  pain, palpitations, syncope, or dyspnea of exertion.  Pulmonary: reports cough, denies shortness of breath, hemoptysis, wheezing, chest pain or flu-like symptoms.   GI: denies nausea, vomiting, diarrhea, GI bleeding, change in appetite, abdominal pain, and change in bowel habits.  : denies dysuria, urinary incontinence, hematuria.  Heme/oncology: denies history of easy bruising or bleeding. No history of cancer, DVTor PE.  Allergy/immunology: denies hives/urticaria, or itching.   Dermatologic: denies new rash, or new skin lesions.  Musculoskeletal:denies joint swelling or pain, muscle pain, neck and back pain.   Neurologic: denies headaches, acute visual changes, facial droopiness, reports worsening right sided weakness, denies paresthesias, anesthesia, ataxia, change in speech or language, memory loss, abnormal movements, seizures, loss of consciousness, or episodes of confusion.   Psychiatric: denies symptoms of depression, anxiety, hallucinations, mood swings or changes, suicidal or homicidal thoughts.     Physical examination:   Vitals:    10/19/22 0600 10/19/22 0729 10/19/22 0731 10/19/22 0733   BP: 134/73 139/78     Pulse: 91   86   Resp: 19 19    Temp: 36.4 °C (97.5 °F)      TempSrc: Temporal      SpO2: 97%   96%   Weight:       Height:         General: Patient in no acute distress, pleasant and cooperative.  HEENT: Normocephalic, no signs of acute trauma.   Neck: supple, no meningeal signs or carotid bruits. There is normal range of motion. No tenderness on exam.   Chest: clear to auscultation. No cough.   CV: RRR, no murmurs.   Skin: no signs of acute rashes or trauma.   Musculoskeletal: joints exhibit full range of motion, without any pain to palpation. There are no signs of joint or muscle swelling. There is no tenderness to deep palpation of muscles.   Psychiatric: No hallucinatory behavior. Denies symptoms of depression or suicidal ideation. Mood and affect appear normal on exam.     NEUROLOGICAL  EXAM:   Mental status, orientation: Awake, alert and fully oriented.   Speech and language: speech is clear and fluent. The patient is able to name, repeat and comprehend.   Memory: There is intact recollection of recent and remote events.   Cranial nerve exam: Pupils are 3-4 mm bilaterally and equally reactive to light and accommodation. Visual fields are intact by confrontation. There is no nystagmus on primary or secondary gaze. Intact full EOM in all directions of gaze. Face appears symmetric. Sensation in the face is intact to light touch. Uvula is midline. Tongue is midline. Shoulder shrug is intact bilaterally.   Motor exam: Strength is 5/5 in all extremities. Tone is normal.   Sensory exam reveals normal sense of light touch, proprioception, vibration and pinprick in all extremities.   Deep tendon reflexes:  2+ throughout. Plantar responses are flexor. There is no clonus.   Coordination: essential tremor in bilateral upper extremities   Gait: Not assessed due patient preference    NIH Stroke Scale    1a Level of Consciousness 0  1b Orientation Questions 0  1c Response to Commands 0  2 Gaze 0  3 Visual Fields 0  4 Facial Movement 0  5 Motor Function (arm)   a Left 0  b Right 0  6 Motor Function (leg)   a Left 0  b Right 0  7 Limb Ataxia 0  8 Sensory 0  9 Language 0  10 Articulation 0  11 Extinction/Inattention 0    Score: 0    ANCILLARY DATA REVIEWED:     Lab Data Review:  Recent Results (from the past 24 hour(s))   CBC WITH DIFFERENTIAL    Collection Time: 10/19/22 12:17 AM   Result Value Ref Range    WBC 4.6 (L) 4.8 - 10.8 K/uL    RBC 3.74 (L) 4.70 - 6.10 M/uL    Hemoglobin 10.6 (L) 14.0 - 18.0 g/dL    Hematocrit 32.7 (L) 42.0 - 52.0 %    MCV 87.4 81.4 - 97.8 fL    MCH 28.3 27.0 - 33.0 pg    MCHC 32.4 (L) 33.7 - 35.3 g/dL    RDW 66.3 (H) 35.9 - 50.0 fL    Platelet Count 251 164 - 446 K/uL    MPV 11.3 9.0 - 12.9 fL    Neutrophils-Polys 54.70 44.00 - 72.00 %    Lymphocytes 37.50 22.00 - 41.00 %    Monocytes  6.50 0.00 - 13.40 %    Eosinophils 0.90 0.00 - 6.90 %    Basophils 0.00 0.00 - 1.80 %    Immature Granulocytes 0.40 0.00 - 0.90 %    Nucleated RBC 0.00 /100 WBC    Neutrophils (Absolute) 2.54 1.82 - 7.42 K/uL    Lymphs (Absolute) 1.74 1.00 - 4.80 K/uL    Monos (Absolute) 0.30 0.00 - 0.85 K/uL    Eos (Absolute) 0.04 0.00 - 0.51 K/uL    Baso (Absolute) 0.00 0.00 - 0.12 K/uL    Immature Granulocytes (abs) 0.02 0.00 - 0.11 K/uL    NRBC (Absolute) 0.00 K/uL    Anisocytosis 1+     Macrocytosis 1+    ABO Rh Confirm    Collection Time: 10/19/22 12:17 AM   Result Value Ref Range    ABO Rh Confirm A POS    PERIPHERAL SMEAR REVIEW    Collection Time: 10/19/22 12:17 AM   Result Value Ref Range    Peripheral Smear Review see below    PLATELET ESTIMATE    Collection Time: 10/19/22 12:17 AM   Result Value Ref Range    Plt Estimation Normal    MORPHOLOGY    Collection Time: 10/19/22 12:17 AM   Result Value Ref Range    RBC Morphology Present     Poikilocytosis 1+     Ovalocytes 1+     Echinocytes 1+    DIFFERENTIAL COMMENT    Collection Time: 10/19/22 12:17 AM   Result Value Ref Range    Comments-Diff see below    COD (ADULT)    Collection Time: 10/19/22 12:45 AM   Result Value Ref Range    ABO Grouping Only A     Rh Grouping Only POS     Antibody Screen-Cod NEG    Comp Metabolic Panel    Collection Time: 10/19/22 12:45 AM   Result Value Ref Range    Sodium 135 135 - 145 mmol/L    Potassium 3.7 3.6 - 5.5 mmol/L    Chloride 100 96 - 112 mmol/L    Co2 25 20 - 33 mmol/L    Anion Gap 10.0 7.0 - 16.0    Glucose 141 (H) 65 - 99 mg/dL    Bun 14 8 - 22 mg/dL    Creatinine 0.89 0.50 - 1.40 mg/dL    Calcium 8.7 8.5 - 10.5 mg/dL    AST(SGOT) 39 12 - 45 U/L    ALT(SGPT) 59 (H) 2 - 50 U/L    Alkaline Phosphatase 63 30 - 99 U/L    Total Bilirubin 0.3 0.1 - 1.5 mg/dL    Albumin 3.4 3.2 - 4.9 g/dL    Total Protein 6.7 6.0 - 8.2 g/dL    Globulin 3.3 1.9 - 3.5 g/dL    A-G Ratio 1.0 g/dL   TROPONIN    Collection Time: 10/19/22 12:45 AM   Result Value  Ref Range    Troponin T 14 6 - 19 ng/L   Prothrombin Time    Collection Time: 10/19/22 12:45 AM   Result Value Ref Range    PT 14.9 (H) 12.0 - 14.6 sec    INR 1.19 (H) 0.87 - 1.13   APTT    Collection Time: 10/19/22 12:45 AM   Result Value Ref Range    APTT 27.4 24.7 - 36.0 sec   ESTIMATED GFR    Collection Time: 10/19/22 12:45 AM   Result Value Ref Range    GFR (CKD-EPI) 90 >60 mL/min/1.73 m 2   PROCALCITONIN    Collection Time: 10/19/22 12:45 AM   Result Value Ref Range    Procalcitonin 0.17 <0.25 ng/mL   proBrain Natriuretic Peptide, NT    Collection Time: 10/19/22 12:45 AM   Result Value Ref Range    NT-proBNP 79 0 - 125 pg/mL   MAGNESIUM    Collection Time: 10/19/22 12:45 AM   Result Value Ref Range    Magnesium 1.8 1.5 - 2.5 mg/dL   PHOSPHORUS    Collection Time: 10/19/22 12:45 AM   Result Value Ref Range    Phosphorus 2.8 2.5 - 4.5 mg/dL   Lipid Profile    Collection Time: 10/19/22 12:45 AM   Result Value Ref Range    Cholesterol,Tot 107 100 - 199 mg/dL    Triglycerides 63 0 - 149 mg/dL    HDL 48 >=40 mg/dL    LDL 46 <100 mg/dL   TSH WITH REFLEX TO FT4    Collection Time: 10/19/22 12:45 AM   Result Value Ref Range    TSH 1.100 0.380 - 5.330 uIU/mL   EKG (NOW)    Collection Time: 10/19/22  1:06 AM   Result Value Ref Range    Report       [facility] Emergency Dept.    Test Date:  2022-10-19  Pt Name:    SHONA DIAZ                 Department: ER  MRN:        [id number]                      Room:        22  Gender:     Male                         Technician: [id number]  :        1950                   Requested By:DIANA CASTRO  Order #:    [id number]                    Reading MD:    Measurements  Intervals                                Axis  Rate:       92                           P:          67  RI:         225                          QRS:        -44  QRSD:       115                          T:          49  QT:         379  QTc:        469    Interpretive Statements  Sinus  rhythm  Prolonged NH interval  Incomplete left bundle branch block  No previous ECG available for comparison     POCT glucose device results    Collection Time: 10/19/22  1:17 AM   Result Value Ref Range    POC Glucose, Blood 125 (H) 65 - 99 mg/dL       Labs reviewed by me.       Imaging reviewed by me:     DX-CHEST-PORTABLE (1 VIEW)   Final Result      1.  Left-sided pulmonary artery pressure monitor in place      2.  Cardiomegaly with evidence of mild fluid overload.      CT-CTA NECK WITH & W/O-POST PROCESSING   Final Result      CT angiogram of the neck within normal limits.      CT-CTA HEAD WITH & W/O-POST PROCESS   Final Result      CT angiogram of the [place] within normal limits.      CT-CEREBRAL PERFUSION ANALYSIS   Final Result      Cerebral Perfusion study with no evidence of acute cerebral infarction or ischemia with attention to the MCA territories.      CT-HEAD W/O   Final Result      1.  Cerebral atrophy.      2.  Otherwise, Head CT without contrast within normal limits. No evidence of acute cerebral infarction, hemorrhage or mass lesion.         MR-BRAIN-W/O    (Results Pending)   EC-ECHOCARDIOGRAM COMPLETE W/O CONT    (Results Pending)         ASSESSMENT AND PLAN:    72 y.o. male with a PMHx of DM II, MI s/p stenting, CVA x2 with right sided deficits at baseline, essential tremor, and possible COPD/emphysema who presented early this morning with worsening acute on chronic RUE and RLE weakness concerning for possible stroke. Per the patient he had flown from [place] yesterday and had taken 2mg of Ativan prior to the flight for anxiety. Upon arriving in [place] the patient's family noted that the patient was behaving atypically and that their RUE and RLE appeared weaker than baseline.    In the ED a CT head was negative for acute intracranial processes, CTA head/neck were negative for LVO or critical flow limiting stenosis, amd CT perfusion was negative for CBF mismatch. The patient was deemed not  a candidate for thrombolytic therapy due to last known well. Patient was also not a candidate for thrombectomy due to absence of LVO.    On exam the patient is neurologically nonfocal. Low suspicion for acute stroke. Patient refuses to undergo MRI brain.    Recommendations  - PT/OT evaluations  - Optimize medical management  - Resume home Brilinta and ASA  - Patient has implanted loop recorder and is followed by cardiology in [place], no indication of Atrial Fibrillation at this time.    From the acute neurology perspective there are no further recommendations. Neurology will sign off at this time. Please feel free to contact us with any further questions.    Patient case discussed with Dr. Zain Irvin, Neurology        JORGITO Ceballos  [facility]

## 2022-10-19 NOTE — PROGRESS NOTES
72-year-old male with history of diabetes, MI, COPD with strokes who presented 10/19 with weakness, patient states he has been weaker on his legs, patient has all the stroke and he has weakness on the right side, no significant changes he has some slurred speech, patient also has some shortness of breath with dry coughing, no fever all chills or other symptoms.  On admission stroke code was activated, CT head did not show any abnormalities and CTA for head neck did not show any obstruction, neurologist did not recommend any intervention however, ordered MRI, patient refused the MRI.  We will continue aspirin and statin with PT and OT.    Patient was found to have a crackles with some wheezing, no echo on his chart, BNP was 79 and chest x-ray showed signs of pulmonary edema, initially patient needed 4 L nasal cannula, his baseline is room air.  Start Lasix and echo is pending.  Patient will be admitted for acute respiratory failure with hypoxia due to COPD exacerbation/pulmonary hypertension possible pulmonary edema. Will check D-dimer and will continue inhalers, no need for steroid at this time.  Check COVID 19 infection and flu.

## 2022-10-19 NOTE — CARE PLAN
Problem: Psychosocial  Goal: Patient's level of anxiety will decrease  Outcome: Progressing  Goal: Patient's ability to verbalize feelings about condition will improve  Outcome: Progressing   The patient is Stable - Low risk of patient condition declining or worsening    Shift Goals  Clinical Goals: stable neuro status  Patient Goals: go home    Progress made toward(s) clinical / shift goals:  patient A&Ox4, refuses MRI. States symptoms have resolved    Patient is not progressing towards the following goals:

## 2022-10-19 NOTE — RESPIRATORY CARE
"COPD EDUCATION by COPD CLINICAL EDUCATOR  10/19/2022  at  3:52 PM by Mechelle Robles, RRT     Patient interviewed by COPD education team.  Patient unable to participate in full program.  A short intervention has been conducted.  A comprehensive packet including information about COPD, types of treatments to manage their disease and safe home Oxygen usage was provided and reviewed with patient at the bedside.  Patient lives in [place], came to [place] for recreation. We discussed how elevation can cause hypoxia, and the effects of hypoxia on other organs.       COPD Screen  COPD Risk Screening  Do you have a history of COPD?: Yes  Do you have a Pulmonologist?: Yes    COPD Assessment  COPD Clinical Specialists ONLY  COPD Education Initiated: Yes--Short Intervention (Given COPD booklet and information about home O2, [place], has and uses spacer)  DME Company: none prior  DME Equipment Type: none prior  Physician [place]  [place]ulmonologist [place]  Referrals Initiated:  (quit smoking in 1990, [place] of state)  Is this a COPD exacerbation patient?: No  $ Demo/Eval of SVN's, MDI's and Aerosols: Yes (reviewed meds)  (OP) Pulmonary Function Testing: Yes (results not available)    PFT Results    No results found for: PFT    Meds to Beds  Would the patient like to opt in for Bedside Medication Delivery at Discharge?: No     MY COPD ACTION PLAN     It is recommended that patients and physicians /healthcare providers complete this action plan together. This plan should be discussed at each physician visit and updated as needed.    The green, yellow and red zones show groups of symptoms of COPD. This list of symptoms is not comprehensive, and you may experience other symptoms. In the \"Actions\" column, your healthcare provider has recommended actions for you to take based on your symptoms.    Patient Name: Abiel Miller   YOB: 1950   Last Updated on:     Green Zone:  I am doing well today Actions   •  Usual " "activitiy and exercise level •  Take daily medications   •  Usual amounts of cough and phlegm/mucus •  Use oxygen as prescribed   •  Sleep well at night •  Continue regular exercise/diet plan   •  Appetite is good •  At all times avoid cigarette smoke, inhaled irritants     Daily Medications (these medications are taken every day):   Fluticasone and Salmeterol (Wixela)  Tiotropium Bromide Monohydrate (Spiriva) 1 Puff  1 capsule Once daily  Once daily     Additional Information:  Rinse mouth after taking Wexela    Yellow Zone:  I am having a bad day or a COPD flare Actions   •  More breathless than usual •  Continue daily medications   •  I have less energy for my daily activities •  Use quick relief inhaler as ordered   •  Increased or thicker phlegm/mucus •  Use oxygen as prescribed   •  Using quick relief inhaler/nebulizer more often •  Get plenty of rest   •  Swelling of ankles more than usual •  Use pursed lip breathing   •  More coughing than usual •  At all times avoid cigarette smoke, inhaled irritants   •  I feel like I have a \"chest cold\"   •  Poor sleep and my symptoms woke me up   •  My appetite is not good   •  My medicine is not helping    •  Call provider immediately if symptoms don’t improve     Continue daily medications, add rescue medications:   Albuterol 2 Puffs         Medications to be used during a flare up, (as Discussed with Provider):           Additional Information:  Use a spacer with your rescue inhaler    Red Zone:  I need urgent medical care Actions   •  Severe shortness of breath even at rest •  Call 911 or seek medical care immediately   •  Not able to do any activity because of breathing    •  Fever or shaking chills    •  Feeling confused or very drowsy     •  Chest pains    •  Coughing up blood                  "

## 2022-10-19 NOTE — ASSESSMENT & PLAN NOTE
Possible history of COPD and CHF; no sign of exacerbation of either. Patient is somnolent requiring mild stimulation. More likely respiratory depression in the setting of possible stroke or sedation; possible benzo overdose.    -RT protocol  -O2 supplementation  -Can consider flumazenil after MRI

## 2022-10-19 NOTE — PROGRESS NOTES
Med Rec Complete per patient,  Rx bottles  & patient's home pharmacy  Allergies Reviewed with patient  No antibiotics within the last 30 days  Patient's Preferred Pharmacy: [facility]   in [place], [place]  [phone].

## 2022-10-19 NOTE — ASSESSMENT & PLAN NOTE
States they have emphysema and uses inhalers. 110py smoker. Diffuse crackles and rhonchi. Doesn't use oxygen at home.    -RT protocol

## 2022-10-19 NOTE — ED NOTES
Med Rec PARTIAL. Pt had medications in pill box at bedside. Pharmacist verified medications to be the following:  Buspirone 30mg  Metoprolol XL 50mg  Crestor 40mg  Gabapentin 100mg  Duloxetine 60mg  Protonix 40mg  Metformin 100mg  Mirtazapine 30 mg  Primidone 50mg    Pt had two bottles at bedside:  Benzonatate 100mg ([facility])  [facility]prazolam [facility]mg ([facility])    Unable to reach Pt's pharmacies at this time to verify directions of medications.

## 2022-10-19 NOTE — DISCHARGE PLANNING
Care Transition Team Assessment      Met with pt at bedside to complete assessment.      Pt lives with his wife in a single story home w/no steps into home.    He was very independent prior to hospitalization and still drives.  He is here visiting family.    He uses a cane and walk[facility]  He wears hearing aids and glasses.    They do not have an Adv. Directive requested inform. And will give.      His main support is Jessica Miller, spouse @ [phone] and Raúl Miller, brother, lives in [place], [place] @ [phone].    Information Source  Orientation Level: Oriented X4  Information Given By: Patient    Readmission Evaluation  Is this a readmission?: No    Elopement Risk  Legal Hold: No  Ambulatory or Self Mobile in Wheelchair: No-Not an Elopement Risk  Disoriented: No  Psychiatric Symptoms: None  History of Wandering: No  Elopement this Admit: No  Vocalizing Wanting to Leave: No  Displays Behaviors, Body Language Wanting to Leave: No-Not at Risk for Elopement  Elopement Risk: Not at Risk for Elopement    Interdisciplinary Discharge Planning  Primary Care Physician: jason aleman thanks  Lives with - Patient's Self Care Capacity: Spouse  Patient or legal guardian wants to designate a caregiver: Yes  Caregiver name: Jessica Miller  Caregiver contact info: [phone]  Support Systems: Friends / Neighbors, Family Member(s), Children  Housing / Facility: 1 Story House  Mobility Issues: No  Durable Medical Equipment: Other - Specify (cane sometimes)  [facility] Provider [facility]: [facility]    Discharge Preparedness  What is your plan after discharge?: Uncertain - pending medical team collaboration  Prior Functional Level: Ambulatory, Independent with Activities of Daily Living    Functional Assesment  Prior Functional Level: Ambulatory, Independent with Activities of Daily Living    Vision / Hearing Impairment  Vision Impairment : Yes  Right Eye Vision: Impaired  Left Eye Vision: Impaired  Hearing Impairment : Yes  Hearing  Impairment: Both Ears  Does Pt Need Special Equipment for the Hearing Impaired?: No    Domestic Abuse  Have you ever been the victim of abuse or violence?: No  Physical Abuse or Sexual Abuse: No  Verbal Abuse or Emotional Abuse: No  Possible Abuse/Neglect Reported to:: Not Applicable    Psychological Assessment  History of Substance Abuse: None  History of Psychiatric Problems: No

## 2022-10-19 NOTE — ASSESSMENT & PLAN NOTE
History of stroke x2 with residual R sided weakness; none noticed on physical/neuro exam. ABCD2 of 4. NIHSS of 1. CT perfusion, CT head, and CTA head/neck all neg. Dr. Estrada discussed with Dr. Irvin, neurologist, with recommendation of MRI brain w/o.    -Telemetry  -Neuro checks q4h  -Ordered MRI brain w/o; patient states they require sedation  -PT/OT  -NPO pending formal speech eval  - x1, with 81mg daily thereafter  -Atorvastatin 80mg  -Can consider flumazenil after MRI, as this could potentially be symptoms of benzo overdose

## 2022-10-20 VITALS
DIASTOLIC BLOOD PRESSURE: 76 MMHG | TEMPERATURE: 98.2 F | HEIGHT: 70 IN | BODY MASS INDEX: 32.93 KG/M2 | OXYGEN SATURATION: 91 % | HEART RATE: 91 BPM | WEIGHT: 230 LBS | RESPIRATION RATE: 17 BRPM | SYSTOLIC BLOOD PRESSURE: 113 MMHG

## 2022-10-20 LAB
ANION GAP SERPL CALC-SCNC: 9 MMOL/L (ref 7–16)
BASOPHILS # BLD AUTO: 0 % (ref 0–1.8)
BASOPHILS # BLD: 0 K/UL (ref 0–0.12)
BUN SERPL-MCNC: 19 MG/DL (ref 8–22)
CALCIUM SERPL-MCNC: 9 MG/DL (ref 8.5–10.5)
CHLORIDE SERPL-SCNC: 99 MMOL/L (ref 96–112)
CO2 SERPL-SCNC: 27 MMOL/L (ref 20–33)
CREAT SERPL-MCNC: 0.92 MG/DL (ref 0.5–1.4)
CRP SERPL HS-MCNC: 9.73 MG/DL (ref 0–0.75)
EOSINOPHIL # BLD AUTO: 0.07 K/UL (ref 0–0.51)
EOSINOPHIL NFR BLD: 1.6 % (ref 0–6.9)
ERYTHROCYTE [DISTWIDTH] IN BLOOD BY AUTOMATED COUNT: 64.5 FL (ref 35.9–50)
GFR SERPLBLD CREATININE-BSD FMLA CKD-EPI: 88 ML/MIN/1.73 M 2
GLUCOSE SERPL-MCNC: 170 MG/DL (ref 65–99)
HCT VFR BLD AUTO: 36 % (ref 42–52)
HGB BLD-MCNC: 11.5 G/DL (ref 14–18)
IMM GRANULOCYTES # BLD AUTO: 0.01 K/UL (ref 0–0.11)
IMM GRANULOCYTES NFR BLD AUTO: 0.2 % (ref 0–0.9)
LYMPHOCYTES # BLD AUTO: 1.37 K/UL (ref 1–4.8)
LYMPHOCYTES NFR BLD: 30.6 % (ref 22–41)
MAGNESIUM SERPL-MCNC: 1.9 MG/DL (ref 1.5–2.5)
MCH RBC QN AUTO: 27.9 PG (ref 27–33)
MCHC RBC AUTO-ENTMCNC: 31.9 G/DL (ref 33.7–35.3)
MCV RBC AUTO: 87.4 FL (ref 81.4–97.8)
MONOCYTES # BLD AUTO: 0.41 K/UL (ref 0–0.85)
MONOCYTES NFR BLD AUTO: 9.2 % (ref 0–13.4)
NEUTROPHILS # BLD AUTO: 2.61 K/UL (ref 1.82–7.42)
NEUTROPHILS NFR BLD: 58.4 % (ref 44–72)
NRBC # BLD AUTO: 0 K/UL
NRBC BLD-RTO: 0 /100 WBC
PLATELET # BLD AUTO: 269 K/UL (ref 164–446)
PMV BLD AUTO: 9.7 FL (ref 9–12.9)
POTASSIUM SERPL-SCNC: 3.9 MMOL/L (ref 3.6–5.5)
PROCALCITONIN SERPL-MCNC: 0.21 NG/ML
RBC # BLD AUTO: 4.12 M/UL (ref 4.7–6.1)
SODIUM SERPL-SCNC: 135 MMOL/L (ref 135–145)
WBC # BLD AUTO: 4.5 K/UL (ref 4.8–10.8)

## 2022-10-20 PROCEDURE — 99239 HOSP IP/OBS DSCHRG MGMT >30: CPT | Performed by: STUDENT IN AN ORGANIZED HEALTH CARE EDUCATION/TRAINING PROGRAM

## 2022-10-20 PROCEDURE — 97165 OT EVAL LOW COMPLEX 30 MIN: CPT

## 2022-10-20 PROCEDURE — A9270 NON-COVERED ITEM OR SERVICE: HCPCS | Performed by: INTERNAL MEDICINE

## 2022-10-20 PROCEDURE — 700111 HCHG RX REV CODE 636 W/ 250 OVERRIDE (IP): Performed by: INTERNAL MEDICINE

## 2022-10-20 PROCEDURE — A9270 NON-COVERED ITEM OR SERVICE: HCPCS | Performed by: STUDENT IN AN ORGANIZED HEALTH CARE EDUCATION/TRAINING PROGRAM

## 2022-10-20 PROCEDURE — 84145 PROCALCITONIN (PCT): CPT

## 2022-10-20 PROCEDURE — 85025 COMPLETE CBC W/AUTO DIFF WBC: CPT

## 2022-10-20 PROCEDURE — 97161 PT EVAL LOW COMPLEX 20 MIN: CPT

## 2022-10-20 PROCEDURE — 94640 AIRWAY INHALATION TREATMENT: CPT

## 2022-10-20 PROCEDURE — 700102 HCHG RX REV CODE 250 W/ 637 OVERRIDE(OP): Performed by: INTERNAL MEDICINE

## 2022-10-20 PROCEDURE — 700102 HCHG RX REV CODE 250 W/ 637 OVERRIDE(OP): Performed by: STUDENT IN AN ORGANIZED HEALTH CARE EDUCATION/TRAINING PROGRAM

## 2022-10-20 PROCEDURE — 36415 COLL VENOUS BLD VENIPUNCTURE: CPT

## 2022-10-20 PROCEDURE — 80048 BASIC METABOLIC PNL TOTAL CA: CPT

## 2022-10-20 PROCEDURE — 86140 C-REACTIVE PROTEIN: CPT

## 2022-10-20 PROCEDURE — 83735 ASSAY OF MAGNESIUM: CPT

## 2022-10-20 RX ADMIN — FUROSEMIDE 40 MG: 10 INJECTION, SOLUTION INTRAMUSCULAR; INTRAVENOUS at 05:27

## 2022-10-20 RX ADMIN — SENNOSIDES AND DOCUSATE SODIUM 2 TABLET: 50; 8.6 TABLET ORAL at 05:27

## 2022-10-20 RX ADMIN — BUSPIRONE HYDROCHLORIDE 30 MG: 10 TABLET ORAL at 17:20

## 2022-10-20 RX ADMIN — TIOTROPIUM BROMIDE INHALATION SPRAY 5 MCG: 3.12 SPRAY, METERED RESPIRATORY (INHALATION) at 08:16

## 2022-10-20 RX ADMIN — BUSPIRONE HYDROCHLORIDE 30 MG: 10 TABLET ORAL at 05:27

## 2022-10-20 RX ADMIN — DULOXETINE HYDROCHLORIDE 60 MG: 60 CAPSULE, DELAYED RELEASE ORAL at 05:26

## 2022-10-20 RX ADMIN — ROSUVASTATIN CALCIUM 40 MG: 20 TABLET, FILM COATED ORAL at 17:20

## 2022-10-20 RX ADMIN — DULOXETINE HYDROCHLORIDE 60 MG: 60 CAPSULE, DELAYED RELEASE ORAL at 17:21

## 2022-10-20 RX ADMIN — ASPIRIN 81 MG: 81 TABLET, COATED ORAL at 05:26

## 2022-10-20 RX ADMIN — FUROSEMIDE 40 MG: 10 INJECTION, SOLUTION INTRAMUSCULAR; INTRAVENOUS at 16:00

## 2022-10-20 RX ADMIN — METOPROLOL SUCCINATE 25 MG: 25 TABLET, FILM COATED, EXTENDED RELEASE ORAL at 05:26

## 2022-10-20 RX ADMIN — OMEPRAZOLE 40 MG: 20 CAPSULE, DELAYED RELEASE ORAL at 05:26

## 2022-10-20 RX ADMIN — BUDESONIDE AND FORMOTEROL FUMARATE DIHYDRATE 2 PUFF: 80; 4.5 AEROSOL RESPIRATORY (INHALATION) at 08:16

## 2022-10-20 ASSESSMENT — COGNITIVE AND FUNCTIONAL STATUS - GENERAL
DAILY ACTIVITIY SCORE: 24
SUGGESTED CMS G CODE MODIFIER MOBILITY: CJ
MOBILITY SCORE: 21
WALKING IN HOSPITAL ROOM: A LITTLE
SUGGESTED CMS G CODE MODIFIER DAILY ACTIVITY: CH
STANDING UP FROM CHAIR USING ARMS: A LITTLE
CLIMB 3 TO 5 STEPS WITH RAILING: A LITTLE

## 2022-10-20 ASSESSMENT — GAIT ASSESSMENTS
DISTANCE (FEET): 150
DEVIATION: SHUFFLED GAIT
ASSISTIVE DEVICE: QUAD CANE
GAIT LEVEL OF ASSIST: SUPERVISED

## 2022-10-20 ASSESSMENT — ACTIVITIES OF DAILY LIVING (ADL): TOILETING: INDEPENDENT

## 2022-10-20 NOTE — THERAPY
Physical Therapy   Initial Evaluation     Patient Name: Abiel Miller  Age:  72 y.o., Sex:  male  Medical Record #: [id number]  Today's Date: 10/20/2022    Precautions: Fall Risk    Assessment  Patient is 72 y.o. male who presented with worsening R sided weakness and decreased arousal, admitted for concerns of stroke.  PMH includes hx of CVA with residual R deficits, HTN, DM, and current influenza.  Today patient presented with decreased activity tolerance and increased SOB however demonstrated all functional mobility with SPV.  Patient ambulated with slightly antalgic gait with cane and negotiated 2 stairs with reciprocal gait and SPV.  RLE slightly weaker than LLE, patient reported baseline.  Patient appears to be at/near his functional baseline.  No further acute PT needs.    Plan    Recommend Physical Therapy for Evaluation only.    DC Equipment Recommendations: None  Discharge Recommendations: Anticipate that the patient will have no further physical therapy needs after discharge from the hospital     Objective     10/20/22 1220   Precautions   Precautions Fall Risk   Prior Living Situation   Prior Services Home-Independent   Housing / Facility 1 Story House   Steps Into Home 0   Equipment Owned Single Point Cane   Lives with - Patient's Self Care Capacity Spouse   Comments Pt lives in [place] & is visiting family in [place]. Has a few steps to enter family's house   Prior Level of Functional Mobility   Bed Mobility Independent   Transfer Status Independent   Ambulation Independent   Distance Ambulation (Feet) (limited community)   Assistive Devices Used Single Point Cane   Stairs Independent   Cognition    Cognition / Consciousness WDL   Level of Consciousness Alert   Comments Cooperative & receptive to education   Active ROM Lower Body    Active ROM Lower Body  WDL   Strength Lower Body   Lower Body Strength  WDL   Comments LLE grossly 4+/5,  RLE grossly 4/5. Pt reported baseline   Sensation Lower Body   Lower Extremity  Sensation   WDL   Comments Denied numbness/tingling   Balance Assessment   Sitting Balance (Static) Good   Sitting Balance (Dynamic) Good   Standing Balance (Static) Fair   Standing Balance (Dynamic) Fair   Weight Shift Sitting Fair   Weight Shift Standing Fair   Gait Analysis   Gait Level Of Assist Supervised   Assistive Device Quad Cane   Distance (Feet) 150   # of Times Distance was Traveled 1   Deviation Shuffled Gait   # of Stairs Climbed 2   Level of Assist with Stairs Supervised   Weight Bearing Status No restrictions   Comments Reciprocal gait on stairs   Bed Mobility    Supine to Sit Supervised   Sit to Supine (NT, left up in chair)   Scooting Supervised   Rolling Supervised   Functional Mobility   Sit to Stand Supervised   Bed, Chair, Wheelchair Transfer Supervised   Transfer Method Stand Step   Mobility bed mobility, ambulation, stairs   Activity Tolerance   Sitting in Chair Post session   Sitting Edge of Bed 5 min   Standing 6 min   Anticipated Discharge Equipment and Recommendations   DC Equipment Recommendations None   Discharge Recommendations Anticipate that the patient will have no further physical therapy needs after discharge from the hospital

## 2022-10-20 NOTE — DISCHARGE SUMMARY
Discharge Summary    CHIEF COMPLAINT ON ADMISSION  Chief Complaint   Patient presents with    Possible Stroke       Reason for Admission  EMS     Admission Date  10/19/2022    CODE STATUS  Full Code    HPI & HOSPITAL COURSE  Patient is a 73yo male with PMH of NIDDM2, MI s/p stent, CVA x2, ?COPD, and ?essential tremor that presents after flying from [place] today with lethargy, slurred speech, AMS, and worsening RLE weakness. Patient states that they flew to [place] from [facility] today, beforehand having taken ativan 2mg, as they are very claustrophobic. Upon arriving in [place], family noticed that they are not being themself, and patient states that their RUE and RLE feel weaker than normal, moreso the RLE. Patient is A&Ox4, but lethargic requiring minor stimulation to arouse. Patient is unsure of all their diagnoses and/or medications, but does state they take inhalers with extensive smoking history and states they were told they have emphysema, and also states that they take both primidone and gabapentin for tremor.     In the ED, Hgb 10.6, Trop 14, BNP 79. CT perfusion, CT head, and CTA head/neck all neg. CXR showing cardiomegaly and L pulmonary artery pressure monitor.    Patient no longer requiring supplemental oxygen.  He was previously on Tamiflu and will continue this at home.  He was evaluated by neurosurgery and determined not to have had a stroke.  Patient will discharge home with follow-up.    Therefore, he is discharged in good and stable condition to home with close outpatient follow-up.    The patient met 2-midnight criteria for an inpatient stay at the time of discharge.    Discharge Date  10/20/22    FOLLOW UP ITEMS POST DISCHARGE  Influenza-take Tamiflu  Acute respiratory failure-resolved    DISCHARGE DIAGNOSES  Principal Problem:    Acute respiratory failure (HCC) POA: Yes  Active Problems:    Stroke-like symptoms POA: Yes    History of MI (myocardial infarction) POA: Yes    COPD (chronic obstructive  pulmonary disease) ([facility]) POA: Yes    Tremor POA: Yes    Presence of implantable pulmonary artery pressure and heart rate monitoring system POA: Yes    Hypoxia POA: Yes    Mood disorder (HCC) POA: Yes  Resolved Problems:    * No resolved hospital problems. *      FOLLOW UP  No future appointments.  No follow-up provider specified.    MEDICATIONS ON DISCHARGE     Medication List        CONTINUE taking these medications        Instructions   albuterol 108 (90 Base) MCG/ACT Aers inhalation aerosol   Inhale 2 Puffs every 6 hours as needed for Shortness of Breath.  Dose: 2 Puff     ALPRAZolam 1 MG Tabs  Commonly known as: XANAX   Take 1 mg by mouth one time as needed for Anxiety (Taken prior to travelling).  Dose: 1 mg     aspirin 81 MG EC tablet   Take 81 mg by mouth every day.  Dose: 81 mg     benzonatate 100 MG Caps  Commonly known as: TESSALON   Take 100-200 mg by mouth 3 times a day as needed for Cough.  Dose: 100-200 mg     bismuth subsalicylate 262 MG Chew  Commonly known as: PEPTO-BISMOL   Chew 524 mg 4 Times a Day,Before Meals and at Bedtime.  Dose: 524 mg     busPIRone 30 MG tablet  Commonly known as: BUSPAR   Take 30 mg by mouth 2 times a day.  Dose: 30 mg     DULoxetine 60 MG Cpep delayed-release capsule  Commonly known as: CYMBALTA   Take 60 mg by mouth 2 times a day.  Dose: 60 mg     fluticasone-salmeterol 250-50 MCG/ACT Aepb  Commonly known as: Advair   Inhale 1 Puff every 12 hours.  Dose: 1 Puff     gabapentin 100 MG Caps  Commonly known as: NEURONTIN   Take 100 mg by mouth at bedtime.  Dose: 100 mg     Histex 2.5 MG/5ML Syrp  Generic drug: Triprolidine HCl   Take 5 mL by mouth every 8 hours as needed.  Dose: 5 mL     metformin 1000 MG tablet  Commonly known as: GLUCOPHAGE   Take 1,000 mg by mouth 2 times a day.  Dose: 1,000 mg     metoprolol SR 50 MG Tb24  Commonly known as: TOPROL XL   Take 50 mg by mouth every day.  Dose: 50 mg     mirtazapine 30 MG Tbdp  Commonly known as: Remeron   Take 30 mg by mouth  every evening.  Dose: 30 mg     NON SPECIFIED   Take 2 Capsules by mouth every day at 6 PM. 4 Brain Support  Dose: 2 Capsule     oseltamivir 45 MG Caps  Commonly known as: TAMIFLU   Take 45 mg by mouth every 12 hours.  Dose: 45 mg     pantoprazole 40 MG Tbec  Commonly known as: PROTONIX   Take 40 mg by mouth every day.  Dose: 40 mg     primidone 50 MG Tabs  Commonly known as: MYSOLINE   Take 50 mg by mouth every evening.  Dose: 50 mg     rosuvastatin 40 MG tablet  Commonly known as: CRESTOR   Take 40 mg by mouth every day.  Dose: 40 mg              Allergies  No Known Allergies    DIET  Orders Placed This Encounter   Procedures    Diet Order Diet: Consistent CHO (Diabetic)     Standing Status:   Standing     Number of Occurrences:   1     Order Specific Question:   Diet:     Answer:   Consistent CHO (Diabetic) [4]       ACTIVITY  As tolerated.  Weight bearing as tolerated    CONSULTATIONS  Neurology Dr. Irvin    PROCEDURES  None    LABORATORY  Lab Results   Component Value Date    SODIUM 135 10/20/2022    POTASSIUM 3.9 10/20/2022    CHLORIDE 99 10/20/2022    CO2 27 10/20/2022    GLUCOSE 170 (H) 10/20/2022    BUN 19 10/20/2022    CREATININE 0.92 10/20/2022        Lab Results   Component Value Date    WBC 4.5 (L) 10/20/2022    HEMOGLOBIN 11.5 (L) 10/20/2022    HEMATOCRIT 36.0 (L) 10/20/2022    PLATELETCT 269 10/20/2022        Total time of the discharge process exceeds 34 minutes.

## 2022-10-20 NOTE — CARE PLAN
Problem: Communication  Goal: The ability to communicate needs accurately and effectively will improve  Outcome: Progressing     Problem: Discharge Barriers/Planning  Goal: Patient's continuum of care needs are met  Outcome: Progressing     Problem: Respiratory  Goal: Patient will achieve/maintain optimum respiratory ventilation and gas exchange  Outcome: Progressing   The patient is Stable - Low risk of patient condition declining or worsening    Shift Goals  Clinical Goals: decrease o2 requirements  Patient Goals: go home  Family Goals: yvette- not present    Progress made toward(s) clinical / shift goals:  wean oxygen as tolerated.    Patient is not progressing towards the following goals:

## 2022-10-20 NOTE — THERAPY
Occupational Therapy   Initial Evaluation     Patient Name: Abiel Miller  Age:  72 y.o., Sex:  male  Medical Record #: [id number]  Today's Date: 10/20/2022     Precautions  Precautions: (P) Fall Risk    Assessment    Patient is 72 y.o. male admitted with worsening R sided weakness and decreased arousal, admitted for concerns of stroke.  PMH includes hx of CVA with residual R deficits, HTN, DM, and current influenza. Pt normally independent with functional mobility and ADLs at baseline living in a [facility] in [place] with spouse, visiting family in [place]. Pt able to complete all functional mobility and ADLs with supervision and use of cane. Anticipate pt is at his functional baseline, will complete OT order.      Plan    Recommend Occupational Therapy for Evaluation only.    DC Equipment Recommendations: (P) None  Discharge Recommendations: (P) Anticipate that the patient will have no further occupational therapy needs after discharge from the hospital     Objective       10/20/22 1221   Prior Living Situation   Prior Services Home-Independent   Housing / Facility 1 Story House   Steps Into Home 0   Bathroom Set up Walk In Shower   Equipment Owned Single Point Cane   Lives with - Patient's Self Care Capacity Spouse   Prior Level of ADL Function   Self Feeding Independent   Grooming / Hygiene Independent   Bathing Independent   Dressing Independent   Toileting Independent   Prior Level of IADL Function   Medication Management Independent   Laundry Independent   Kitchen Mobility Independent   Finances Independent   Home Management Independent   Shopping Independent   Prior Level Of Mobility Independent With Device in Community   Driving / Transportation Driving Independent   Precautions   Precautions Fall Risk   Pain 0 - 10 Group   Therapist Pain Assessment Post Activity Pain Same as Prior to Activity;Nurse Notified   Cognition    Cognition / Consciousness WDL   Level of Consciousness Alert   Active ROM Upper Body   Active ROM  Upper Body  WDL   Dominant Hand Right   Strength Upper Body   Upper Body Strength  WDL   Sensation Upper Body   Upper Extremity Sensation  WDL   Upper Body Muscle Tone   Upper Body Muscle Tone  WDL   Neurological Concerns   Neurological Concerns No   Coordination Upper Body   Coordination WDL   Balance Assessment   Sitting Balance (Static) Good   Sitting Balance (Dynamic) Good   Standing Balance (Static) Fair   Standing Balance (Dynamic) Fair   Weight Shift Sitting Fair   Weight Shift Standing Fair   Comments w/ cane   Bed Mobility    Supine to Sit Supervised   Scooting Supervised   Rolling Supervised   ADL Assessment   Eating Independent   Grooming Supervision   Upper Body Dressing Supervision   Lower Body Dressing Supervision   How much help from another person does the patient currently need...   Putting on and taking off regular lower body clothing? 4   Bathing (including washing, rinsing, and drying)? 4   Toileting, which includes using a toilet, bedpan, or urinal? 4   Putting on and taking off regular upper body clothing? 4   Taking care of personal grooming such as brushing teeth? 4   Eating meals? 4   6 Clicks Daily Activity Score 24   Functional Mobility   Sit to Stand Supervised   Bed, Chair, Wheelchair Transfer Supervised   Transfer Method Stand Step   Mobility bed mobility, hallway mobility, left up in chair   Comments w/ cane   Activity Tolerance   Sitting in Chair left seated in chair   Sitting Edge of Bed 6 min   Standing 8 min   Education Group   Education Provided Role of Occupational Therapist   Role of Occupational Therapist Patient Response Patient;Acceptance;Explanation   Problem List   Problem List None   Anticipated Discharge Equipment and Recommendations   DC Equipment Recommendations None   Discharge Recommendations Anticipate that the patient will have no further occupational therapy needs after discharge from the hospital   Interdisciplinary Plan of Care Collaboration   IDT Collaboration  with  Nursing;Physical Therapist   Patient Position at End of Therapy Seated;Chair Alarm On;Call Light within Reach;Tray Table within Reach;Phone within Reach   Collaboration Comments RN updated

## 2022-10-20 NOTE — DISCHARGE INSTRUCTIONS
Stroke / CVA / TIA / Hemorrhagic Ischemia Discharge Instructions  You are at risk for a stroke. Chronic Obstructive Pulmonary Disease (COPD) is a long-term, progressive lung disease that makes it harder to breathe. It includes chronic bronchitis, emphysema, and refractory (non-reversible) asthma. With COPD, the airways in your lungs become inflamed and thicken, and the tissue where oxygen is exchanged is destroyed. The flow of air in and out of your lungs decreases. When that happens, less oxygen gets into your body tissues, and it becomes harder to get rid of the waste gas carbon dioxide. As the disease gets worse, shortness of breath makes it harder to remain active. There is no cure for COPD, but it is preventable and treatable.    COPD Patient Discharge Instructions    Diet  Follow a low fat, low cholesterol, low salt diet unless instructed otherwise by your Doctor. Read the labels on the back of food products and track your intake of fat, cholesterol and salt.  No smoking  Discontinuing smoking will have the biggest impact on preventing progression of disease.  To participate in [employer] Quit Tobacco Program, call 002-437-7225 or visit [email or web address]  Oxygen  If your doctor has order that you wear oxygen at home, it is important to wear it as ordered.  Do not smoke, vape, or use e-cigarettes with oxygen on.  Store in an appropriate location: upright in its alberto or laid flat down, away from open flames and stoves.   Do not use oil-based creams and moisturizers (ie: petroleum products, oil-based lip moisturizers) or aerosol sprays (ie: hair sprays or deodorants) when using your oxygen equipment.  Be careful with tubing placement to prevent yourself and others from tripping.  Medications  Refer to your personalized Action Plan to manage your symptoms.  Warning signs of an exacerbation  Breathing fast and shallow, worsening shortness of breath (like you just finished exercising)  Chest  tightness  Increases in sputum production  Changes in sputum color  Lower oxygen levels than baseline  When to call your doctor  If the warning signs of an exacerbation do not improve  Refer to your personalized Action Plan   Pulmonary Rehab  Your doctor has ordered you a referral to Pulmonary Rehab. Call [phone] to schedule an appointment  Attend your follow-up appointment with your PCP and/or Pulmonologist  Remote Monitoring: At the direction of the remote monitoring on-call provider, you may increase your oxygen by 2 liters above your baseline.     See the educational handout provided by your COPD Navigator for more information. This also explains more about COPD, symptoms of an exacerbation, and some of the tests that you have undergone.  Diet    Diabetic Diet     A Diabetic Diet can help you control your blood glucose, lower your risk of heart disease, improve your blood pressure and maintain a healthy weight.  Eating healthy foods, low in calories, fat and carbohydrates at the same time every day can help control your blood glucose. Carbohydrates can greatly affect your blood glucose levels.  Counting carbs is important to keep your blood glucose at a healthy level, especially if you use insulin or take certain oral diabetes medicines.  Avoid alcohol as it can cause a sudden decrease in blood glucose (hypoglycemia), especially if you use insulin or take certain oral diabetes medicines.        It is important that you reduce your risk factors to avoid another stroke in the future. Here are some general guidelines to follow:    Eat healthy - avoid food high in fat  Maintain a healthy weight  Avoid alcohol and illegal drug use Get regular exercise  Avoid smoking  Take your medications as directed     For more information regarding risk factors, refer to pages 17-19 in your Stroke Patient Education Guide. Stroke Education Guide was given to patient.    Warning signs of a stroke include (which can also be  found on page 3 of your Stroke Patient Education Guide):  Sudden numbness of weakness of the face, arm or leg (especially on one side of the body).  Sudden confusion, trouble speaking or understanding.  Sudden trouble seeing in one or both eyes.  Sudden trouble walking, dizziness, loss of balance or coordination.  Sudden severe headache with no known cause.  It is very important to get treatment quickly when a stroke occurs. If you experience any of the above warning signs, call 911 immediately.     Some patients who have had a stroke will be going home on a blood thinner medication called Warfarin (Coumadin).  This medication requires very close monitoring and follow up.  This follow up can be provided by either your Primary Care Physician or by [facility]'s Outpatient Anticoagulation Service.  The Outpatient Anticoagulation Service is located at the [facility] at [facility] ([facility]).  If you do not know when your follow up appointment is scheduled, call [phone] to verify your appointment time.

## 2022-10-20 NOTE — CARE PLAN
Problem: Communication  Goal: The ability to communicate needs accurately and effectively will improve  Outcome: Progressing     Problem: Respiratory  Goal: Patient will achieve/maintain optimum respiratory ventilation and gas exchange  Outcome: Progressing     Problem: Fluid Volume  Goal: Fluid volume balance will be maintained  Outcome: Progressing     Problem: Mobility  Goal: Patient's capacity to carry out activities will improve  Outcome: Progressing     Problem: Self Care  Goal: Patient will have the ability to perform ADLs independently or with assistance (bathe, groom, dress, toilet and feed)  Outcome: Progressing     Problem: Infection - Standard  Goal: Patient will remain free from infection  Outcome: Progressing     Problem: Neuro Status  Goal: Neuro status will remain stable or improve  Outcome: Progressing     Problem: Mobility - Stroke  Goal: Patient's capacity to carry out activities will improve  Outcome: Progressing     Problem: Dysphagia  Goal: Dysphagia will improve  Outcome: Met     Problem: Risk for Aspiration  Goal: Patient's risk for aspiration will be absent or decrease  Outcome: Met     The patient is Stable - Low risk of patient condition declining or worsening    Shift Goals  Clinical Goals: decreased O2 requirements, HD and assessment stable, comfort, safety  Patient Goals: go home  Family Goals: yvette- not present    Progress made toward(s) clinical / shift goals:  pt was able to decrease O2 demands by 0.5L via NC. HD remained stable throughout the night. Assessment stable throughout the night. Pt slept well and is currently resting in bed. Pt complains of no pain throughout the night.     Patient is not progressing towards the following goals:

## 2022-10-20 NOTE — DISCHARGE PLANNING
Case Management Discharge Planning    Admission Date: 10/19/2022  GMLOS: 3.4  ALOS: 1    6-Clicks ADL Score: 24  6-Clicks Mobility Score: 21      Anticipated Discharge Dispo:      DME Needed: No    Action(s) Taken: Updated Provider/Nurse on Discharge Plan and DC Assessment Complete (See below)    Escalations Completed: None    Medically Clear: Yes    Next Steps: Pt will discharge to brother's home.  No needs.    Barriers to Discharge: None

## 2022-10-21 NOTE — PROGRESS NOTES
Monitor Summary: SR 81-97, [place] .0.23, QRS .0.10, QT .0.35 with rare PVCs per strip from monitor room

## 2024-08-22 NOTE — H&P
[facility] History & Physical Note    Date of Service  10/19/2022    [facility]: [facility]   Attending: Alex Valiente M.d.  Senior Resident: Dr. Jean Claude Post MD  Contact Number: [phone]    Primary Care Physician  No primary care provider on file.    Consultants  neurology    Specialist Names: Heraclio Irvin    Code Status  Full Code    Chief Complaint  Chief Complaint   Patient presents with    Possible Stroke       History of Presenting Illness (HPI):   Patient is a 73yo male with PMH of NIDDM2, MI s/p stent, CVA x2, ?COPD, and ?essential tremor that presents after flying from [place] today with lethargy, slurred speech, AMS, and worsening RLE weakness. Patient states that they flew to [place] from [facility] today, beforehand having taken ativan 2mg, as they are very claustrophobic. Upon arriving in [place], family noticed that they are not being themself, and patient states that their RUE and RLE feel weaker than normal, moreso the RLE. Patient is A&Ox4, but lethargic requiring minor stimulation to arouse. Patient is unsure of all their diagnoses and/or medications, but does state they take inhalers with extensive smoking history and states they were told they have emphysema, and also states that they take both primidone and gabapentin for tremor.    In the ED, Hgb 10.6, Trop 14, BNP 79. CT perfusion, CT head, and CTA head/neck all neg. CXR showing cardiomegaly and L pulmonary artery pressure monitor.    I discussed the plan of care with patient.    Review of Systems  Review of Systems   Constitutional:  Negative for chills, fever and malaise/fatigue.   HENT:  Negative for ear pain and sore throat.    Eyes:  Negative for blurred vision and double vision.   Respiratory:  Positive for shortness of breath (chronic). Negative for cough.    Cardiovascular:  Negative for chest pain, palpitations and leg swelling.   Gastrointestinal:  Negative for abdominal pain, blood in stool, constipation, diarrhea, melena, nausea  and vomiting.   Genitourinary:  Negative for dysuria and hematuria.   Musculoskeletal:  Negative for back pain, joint pain and neck pain.   Neurological:  Positive for tremors (chronic UE) and focal weakness (RLE and RUE). Negative for dizziness, tingling, sensory change, speech change, seizures, loss of consciousness, weakness and headaches.     Past Medical History   has a past medical history of Diabetes (HCC), MI (myocardial infarction) ([facility]), and Stroke (HCC).    Surgical History   has no past surgical history on file.     Family History  family history is not on file.   Family history reviewed with patient.     Social History  Tobacco: 105py smoker, quit 30yrs ago.  Alcohol: Denies  Recreational drugs (illegal or prescription): Denies  Employment: Retired  Living Situation: Lives in [place], [place] with wife.  Recent Travel: Flew in from [place]  Primary Care Provider: Reviewed Unknown  Other (stressors, spirituality, exposures): N/A    Allergies  No Known Allergies    Medications  None       Physical Exam  Pulse:  [87-92] 91  Resp:  [20-24] 22  BP: (120-135)/(68-83) 123/70  SpO2:  [95 %-99 %] 96 %  Blood Pressure : 123/70       Pulse: 91   Respiration: (!) 22   Pulse Oximetry: 96 %       Physical Exam  Constitutional:       General: He is not in acute distress.     Appearance: He is obese. He is ill-appearing. He is not diaphoretic.      Comments: Somnolent requiring mild stimulation   HENT:      Head: Normocephalic and atraumatic.      Mouth/Throat:      Mouth: Mucous membranes are dry.      Pharynx: No oropharyngeal exudate or posterior oropharyngeal erythema.   Eyes:      General: No scleral icterus.     Extraocular Movements: Extraocular movements intact.      Pupils: Pupils are equal, round, and reactive to light.   Cardiovascular:      Rate and Rhythm: Normal rate and regular rhythm.   Pulmonary:      Effort: No respiratory distress.      Breath sounds: No stridor. Rhonchi and rales present. No wheezing.    Abdominal:      General: There is no distension.      Palpations: Abdomen is soft. There is no mass.      Tenderness: There is no abdominal tenderness. There is no guarding or rebound.   Musculoskeletal:         General: No swelling or tenderness.      Cervical back: Neck supple. No rigidity or tenderness.      Right lower leg: No edema.      Left lower leg: No edema.   Lymphadenopathy:      Cervical: No cervical adenopathy.   Skin:     General: Skin is warm and dry.      Coloration: Skin is not jaundiced.   Neurological:      General: No focal deficit present.      Mental Status: He is alert and oriented to person, place, and time.      Cranial Nerves: No cranial nerve deficit.      Motor: No weakness.      Deep Tendon Reflexes: Reflexes normal.      Comments: A&Ox4. CN II-XII intact. 5/5 strength in all extremities. Bilateral intention tremor on finger to nose. Without dysdiadochokinesia. 2+ patellar DTRs. Without pronator drift. Romberg not assessed.       Laboratory:  Recent Labs     10/19/22  0017   WBC 4.6*   RBC 3.74*   HEMOGLOBIN 10.6*   HEMATOCRIT 32.7*   MCV 87.4   MCH 28.3   MCHC 32.4*   RDW 66.3*   PLATELETCT 251   MPV 11.3     Recent Labs     10/19/22  0045   SODIUM 135   POTASSIUM 3.7   CHLORIDE 100   CO2 25   GLUCOSE 141*   BUN 14   CREATININE 0.89   CALCIUM 8.7     Recent Labs     10/19/22  0045   ALTSGPT 59*   ASTSGOT 39   ALKPHOSPHAT 63   TBILIRUBIN 0.3   GLUCOSE 141*     Recent Labs     10/19/22  0045   APTT 27.4   INR 1.19*     Recent Labs     10/19/22  0045   NTPROBNP 79     Recent Labs     10/19/22  0045   TRIGLYCERIDE 63   HDL 48   LDL 46     Recent Labs     10/19/22  0045   TROPONINT 14       Imaging:  DX-CHEST-PORTABLE (1 VIEW)   Final Result      1.  Left-sided pulmonary artery pressure monitor in place      2.  Cardiomegaly with evidence of mild fluid overload.      CT-CTA NECK WITH & W/O-POST PROCESSING   Final Result      CT angiogram of the neck within normal limits.      CT-CTA HEAD  WITH & W/O-POST PROCESS   Final Result      CT angiogram of the [place] within normal limits.      CT-CEREBRAL PERFUSION ANALYSIS   Final Result      Cerebral Perfusion study with no evidence of acute cerebral infarction or ischemia with attention to the MCA territories.      CT-HEAD W/O   Final Result      1.  Cerebral atrophy.      2.  Otherwise, Head CT without contrast within normal limits. No evidence of acute cerebral infarction, hemorrhage or mass lesion.         MR-BRAIN-W/O    (Results Pending)       X-Ray:  I have personally reviewed the images and compared with prior images.  EKG:  I have personally reviewed the images and compared with prior images.    Assessment/Plan:  Problem Representation:   Patient is a 71yo male with PMH of NIDDM2, MI s/p stent, CVA x2, ?COPD, and ?essential tremor that presents after flying from [place] today with lethargy, slurred speech, AMS, and worsening RLE weakness. Admitted for stroke work-up.    I anticipate this patient is appropriate for observation status at this time because likely discharge if MRI negative.    Patient will need a Telemetry bed on MEDICAL service .  The need is secondary to possible stroke.    * Stroke-like symptoms- (present on admission)  Assessment & Plan  History of stroke x2 with residual R sided weakness; none noticed on physical/neuro exam. ABCD2 of 4. NIHSS of 1. CT perfusion, CT head, and CTA head/neck all neg. Dr. Estrada discussed with Dr. Irvin, neurologist, with recommendation of MRI brain w/o.    -Telemetry  -Neuro checks q4h  -Ordered MRI brain w/o; patient states they require sedation  -PT/OT  -NPO pending formal speech eval  - x1, with 81mg daily thereafter  -Atorvastatin 80mg  -Can consider flumazenil after MRI, as this could potentially be symptoms of benzo overdose    Hypoxia- (present on admission)  Assessment & Plan  Possible history of COPD and CHF; no sign of exacerbation of either. Patient is somnolent requiring  mild stimulation. More likely respiratory depression in the setting of possible stroke or sedation; possible benzo overdose.    -RT protocol  -O2 supplementation  -Can consider flumazenil after MRI    Mood disorder (HCC)- (present on admission)  Assessment & Plan  States that they take buspar 30mg for mood.    -Pending confirmation of meds by pharmacy    Presence of implantable pulmonary artery pressure and heart rate monitoring system- (present on admission)  Assessment & Plan  CXR showing L pulm artery pressure monitor, likely for CHF. No sign of CHF exacerbation.    -CTM    Tremor- (present on admission)  Assessment & Plan  States they have chronic tremor, using primidone and gabapentin.    -Pending confirmation of meds by pharmacy    COPD (chronic obstructive pulmonary disease) ([facility])- (present on admission)  Assessment & Plan  States they have emphysema and uses inhalers. 110py smoker. Diffuse crackles and rhonchi. Doesn't use oxygen at home.    -RT protocol    History of MI (myocardial infarction)- (present on admission)  Assessment & Plan  Doesn't recall when, but stent x1.        VTE prophylaxis: SCDs/TEDs and pharmacologic prophylaxis contraindicated due to possible stroke.     yes